# Patient Record
Sex: FEMALE | Race: WHITE | NOT HISPANIC OR LATINO | Employment: OTHER | URBAN - METROPOLITAN AREA
[De-identification: names, ages, dates, MRNs, and addresses within clinical notes are randomized per-mention and may not be internally consistent; named-entity substitution may affect disease eponyms.]

---

## 2021-06-02 ENCOUNTER — HOSPITAL ENCOUNTER (OUTPATIENT)
Facility: HOSPITAL | Age: 61
Setting detail: OBSERVATION
Discharge: HOME | End: 2021-06-04
Attending: EMERGENCY MEDICINE | Admitting: INTERNAL MEDICINE

## 2021-06-02 ENCOUNTER — APPOINTMENT (OUTPATIENT)
Dept: CT IMAGING | Facility: HOSPITAL | Age: 61
End: 2021-06-02

## 2021-06-02 ENCOUNTER — APPOINTMENT (OUTPATIENT)
Dept: GENERAL RADIOLOGY | Facility: HOSPITAL | Age: 61
End: 2021-06-02

## 2021-06-02 DIAGNOSIS — R07.9 CHEST PAIN, UNSPECIFIED TYPE: Primary | ICD-10-CM

## 2021-06-02 DIAGNOSIS — I47.1 SVT (SUPRAVENTRICULAR TACHYCARDIA) (HCC): ICD-10-CM

## 2021-06-02 LAB
ALBUMIN SERPL-MCNC: 4 G/DL (ref 3.5–5.2)
ALBUMIN/GLOB SERPL: 1.5 G/DL
ALP SERPL-CCNC: 140 U/L (ref 39–117)
ALT SERPL W P-5'-P-CCNC: 8 U/L (ref 1–33)
ANION GAP SERPL CALCULATED.3IONS-SCNC: 9 MMOL/L (ref 5–15)
AST SERPL-CCNC: 12 U/L (ref 1–32)
BASOPHILS # BLD AUTO: 0.04 10*3/MM3 (ref 0–0.2)
BASOPHILS NFR BLD AUTO: 0.5 % (ref 0–1.5)
BILIRUB SERPL-MCNC: 0.4 MG/DL (ref 0–1.2)
BUN SERPL-MCNC: 9 MG/DL (ref 8–23)
BUN/CREAT SERPL: 9.4 (ref 7–25)
CALCIUM SPEC-SCNC: 8.9 MG/DL (ref 8.6–10.5)
CHLORIDE SERPL-SCNC: 104 MMOL/L (ref 98–107)
CO2 SERPL-SCNC: 30 MMOL/L (ref 22–29)
CREAT SERPL-MCNC: 0.96 MG/DL (ref 0.57–1)
DEPRECATED RDW RBC AUTO: 47.3 FL (ref 37–54)
EOSINOPHIL # BLD AUTO: 0.39 10*3/MM3 (ref 0–0.4)
EOSINOPHIL NFR BLD AUTO: 5.1 % (ref 0.3–6.2)
ERYTHROCYTE [DISTWIDTH] IN BLOOD BY AUTOMATED COUNT: 13.2 % (ref 12.3–15.4)
GFR SERPL CREATININE-BSD FRML MDRD: 59 ML/MIN/1.73
GLOBULIN UR ELPH-MCNC: 2.7 GM/DL
GLUCOSE SERPL-MCNC: 101 MG/DL (ref 65–99)
HCT VFR BLD AUTO: 37.9 % (ref 34–46.6)
HGB BLD-MCNC: 11.8 G/DL (ref 12–15.9)
HOLD SPECIMEN: NORMAL
HOLD SPECIMEN: NORMAL
IMM GRANULOCYTES # BLD AUTO: 0.02 10*3/MM3 (ref 0–0.05)
IMM GRANULOCYTES NFR BLD AUTO: 0.3 % (ref 0–0.5)
LIPASE SERPL-CCNC: 7 U/L (ref 13–60)
LYMPHOCYTES # BLD AUTO: 1.68 10*3/MM3 (ref 0.7–3.1)
LYMPHOCYTES NFR BLD AUTO: 21.8 % (ref 19.6–45.3)
MCH RBC QN AUTO: 30.7 PG (ref 26.6–33)
MCHC RBC AUTO-ENTMCNC: 31.1 G/DL (ref 31.5–35.7)
MCV RBC AUTO: 98.7 FL (ref 79–97)
MONOCYTES # BLD AUTO: 0.68 10*3/MM3 (ref 0.1–0.9)
MONOCYTES NFR BLD AUTO: 8.8 % (ref 5–12)
NEUTROPHILS NFR BLD AUTO: 4.89 10*3/MM3 (ref 1.7–7)
NEUTROPHILS NFR BLD AUTO: 63.5 % (ref 42.7–76)
NRBC BLD AUTO-RTO: 0 /100 WBC (ref 0–0.2)
NT-PROBNP SERPL-MCNC: 3226 PG/ML (ref 0–900)
PLATELET # BLD AUTO: 262 10*3/MM3 (ref 140–450)
PMV BLD AUTO: 9.9 FL (ref 6–12)
POTASSIUM SERPL-SCNC: 3.7 MMOL/L (ref 3.5–5.2)
PROT SERPL-MCNC: 6.7 G/DL (ref 6–8.5)
RBC # BLD AUTO: 3.84 10*6/MM3 (ref 3.77–5.28)
SODIUM SERPL-SCNC: 143 MMOL/L (ref 136–145)
TROPONIN T SERPL-MCNC: <0.01 NG/ML (ref 0–0.03)
WBC # BLD AUTO: 7.7 10*3/MM3 (ref 3.4–10.8)
WHOLE BLOOD HOLD SPECIMEN: NORMAL
WHOLE BLOOD HOLD SPECIMEN: NORMAL

## 2021-06-02 PROCEDURE — 84145 PROCALCITONIN (PCT): CPT | Performed by: NURSE PRACTITIONER

## 2021-06-02 PROCEDURE — 99285 EMERGENCY DEPT VISIT HI MDM: CPT

## 2021-06-02 PROCEDURE — 80053 COMPREHEN METABOLIC PANEL: CPT | Performed by: EMERGENCY MEDICINE

## 2021-06-02 PROCEDURE — 85025 COMPLETE CBC W/AUTO DIFF WBC: CPT | Performed by: EMERGENCY MEDICINE

## 2021-06-02 PROCEDURE — 84484 ASSAY OF TROPONIN QUANT: CPT | Performed by: EMERGENCY MEDICINE

## 2021-06-02 PROCEDURE — 83880 ASSAY OF NATRIURETIC PEPTIDE: CPT | Performed by: EMERGENCY MEDICINE

## 2021-06-02 PROCEDURE — 83690 ASSAY OF LIPASE: CPT | Performed by: EMERGENCY MEDICINE

## 2021-06-02 PROCEDURE — 0 IOPAMIDOL PER 1 ML: Performed by: EMERGENCY MEDICINE

## 2021-06-02 PROCEDURE — 71275 CT ANGIOGRAPHY CHEST: CPT

## 2021-06-02 PROCEDURE — 93005 ELECTROCARDIOGRAM TRACING: CPT

## 2021-06-02 PROCEDURE — 93005 ELECTROCARDIOGRAM TRACING: CPT | Performed by: EMERGENCY MEDICINE

## 2021-06-02 PROCEDURE — 71045 X-RAY EXAM CHEST 1 VIEW: CPT

## 2021-06-02 RX ORDER — SODIUM CHLORIDE 0.9 % (FLUSH) 0.9 %
10 SYRINGE (ML) INJECTION AS NEEDED
Status: DISCONTINUED | OUTPATIENT
Start: 2021-06-02 | End: 2021-06-04 | Stop reason: HOSPADM

## 2021-06-02 RX ORDER — NITROGLYCERIN 20 MG/100ML
5-200 INJECTION INTRAVENOUS
Status: DISCONTINUED | OUTPATIENT
Start: 2021-06-02 | End: 2021-06-03

## 2021-06-02 RX ORDER — ASPIRIN 81 MG/1
324 TABLET, CHEWABLE ORAL ONCE
Status: DISCONTINUED | OUTPATIENT
Start: 2021-06-02 | End: 2021-06-02

## 2021-06-02 RX ADMIN — IOPAMIDOL 85 ML: 755 INJECTION, SOLUTION INTRAVENOUS at 23:14

## 2021-06-02 RX ADMIN — IOPAMIDOL 175 ML: 755 INJECTION, SOLUTION INTRAVENOUS at 23:28

## 2021-06-03 ENCOUNTER — APPOINTMENT (OUTPATIENT)
Dept: CARDIOLOGY | Facility: HOSPITAL | Age: 61
End: 2021-06-03

## 2021-06-03 PROBLEM — J45.909 ASTHMA: Status: ACTIVE | Noted: 2021-06-03

## 2021-06-03 PROBLEM — I48.91 ATRIAL FIBRILLATION (HCC): Status: ACTIVE | Noted: 2021-06-03

## 2021-06-03 PROBLEM — E66.01 OBESITY, MORBID, BMI 50 OR HIGHER (HCC): Status: ACTIVE | Noted: 2021-06-03

## 2021-06-03 PROBLEM — I10 HTN (HYPERTENSION): Status: ACTIVE | Noted: 2021-06-03

## 2021-06-03 PROBLEM — L03.90 CELLULITIS: Status: ACTIVE | Noted: 2021-06-03

## 2021-06-03 LAB
ANION GAP SERPL CALCULATED.3IONS-SCNC: 7 MMOL/L (ref 5–15)
APTT PPP: 27.9 SECONDS (ref 50–95)
BACTERIA UR QL AUTO: ABNORMAL /HPF
BASOPHILS # BLD AUTO: 0.04 10*3/MM3 (ref 0–0.2)
BASOPHILS # BLD AUTO: 0.05 10*3/MM3 (ref 0–0.2)
BASOPHILS NFR BLD AUTO: 0.6 % (ref 0–1.5)
BASOPHILS NFR BLD AUTO: 0.6 % (ref 0–1.5)
BH CV REST NUCLEAR ISOTOPE DOSE: 34.9 MCI
BH CV STRESS BP STAGE 1: NORMAL
BH CV STRESS BP STAGE 2: NORMAL
BH CV STRESS COMMENTS STAGE 1: NORMAL
BH CV STRESS DOSE REGADENOSON STAGE 1: 0.4
BH CV STRESS DURATION MIN STAGE 1: 1
BH CV STRESS DURATION MIN STAGE 2: 1
BH CV STRESS DURATION MIN STAGE 3: 1
BH CV STRESS DURATION MIN STAGE 4: 1
BH CV STRESS DURATION SEC STAGE 2: 0
BH CV STRESS HR STAGE 1: 110
BH CV STRESS HR STAGE 2: 112
BH CV STRESS HR STAGE 3: 112
BH CV STRESS HR STAGE 4: 110
BH CV STRESS NUCLEAR ISOTOPE DOSE: 34.9 MCI
BH CV STRESS O2 STAGE 1: 97
BH CV STRESS O2 STAGE 2: 97
BH CV STRESS O2 STAGE 3: 96
BH CV STRESS O2 STAGE 4: 96
BH CV STRESS PROTOCOL 1: NORMAL
BH CV STRESS RECOVERY BP: NORMAL MMHG
BH CV STRESS RECOVERY HR: 110 BPM
BH CV STRESS RECOVERY O2: 96 %
BH CV STRESS STAGE 1: 1
BH CV STRESS STAGE 2: 2
BH CV STRESS STAGE 3: 3
BH CV STRESS STAGE 4: 4
BILIRUB UR QL STRIP: NEGATIVE
BUN SERPL-MCNC: 8 MG/DL (ref 8–23)
BUN/CREAT SERPL: 9.1 (ref 7–25)
CALCIUM SPEC-SCNC: 8.1 MG/DL (ref 8.6–10.5)
CHLORIDE SERPL-SCNC: 104 MMOL/L (ref 98–107)
CHOLEST SERPL-MCNC: 124 MG/DL (ref 0–200)
CLARITY UR: ABNORMAL
CO2 SERPL-SCNC: 28 MMOL/L (ref 22–29)
COLOR UR: YELLOW
CREAT SERPL-MCNC: 0.88 MG/DL (ref 0.57–1)
D-LACTATE SERPL-SCNC: 0.7 MMOL/L (ref 0.5–2)
DEPRECATED RDW RBC AUTO: 47.8 FL (ref 37–54)
DEPRECATED RDW RBC AUTO: 47.9 FL (ref 37–54)
EOSINOPHIL # BLD AUTO: 0.4 10*3/MM3 (ref 0–0.4)
EOSINOPHIL # BLD AUTO: 0.41 10*3/MM3 (ref 0–0.4)
EOSINOPHIL NFR BLD AUTO: 5.2 % (ref 0.3–6.2)
EOSINOPHIL NFR BLD AUTO: 5.6 % (ref 0.3–6.2)
ERYTHROCYTE [DISTWIDTH] IN BLOOD BY AUTOMATED COUNT: 13.2 % (ref 12.3–15.4)
ERYTHROCYTE [DISTWIDTH] IN BLOOD BY AUTOMATED COUNT: 13.2 % (ref 12.3–15.4)
FLUAV RNA RESP QL NAA+PROBE: NOT DETECTED
FLUBV RNA RESP QL NAA+PROBE: NOT DETECTED
GFR SERPL CREATININE-BSD FRML MDRD: 65 ML/MIN/1.73
GLUCOSE SERPL-MCNC: 97 MG/DL (ref 65–99)
GLUCOSE UR STRIP-MCNC: NEGATIVE MG/DL
HBA1C MFR BLD: 5.2 % (ref 4.8–5.6)
HCT VFR BLD AUTO: 34 % (ref 34–46.6)
HCT VFR BLD AUTO: 36 % (ref 34–46.6)
HDLC SERPL-MCNC: 41 MG/DL (ref 40–60)
HGB BLD-MCNC: 10.6 G/DL (ref 12–15.9)
HGB BLD-MCNC: 11 G/DL (ref 12–15.9)
HGB UR QL STRIP.AUTO: ABNORMAL
HOLD SPECIMEN: NORMAL
HYALINE CASTS UR QL AUTO: ABNORMAL /LPF
IMM GRANULOCYTES # BLD AUTO: 0.01 10*3/MM3 (ref 0–0.05)
IMM GRANULOCYTES # BLD AUTO: 0.02 10*3/MM3 (ref 0–0.05)
IMM GRANULOCYTES NFR BLD AUTO: 0.1 % (ref 0–0.5)
IMM GRANULOCYTES NFR BLD AUTO: 0.3 % (ref 0–0.5)
INR PPP: 1.06 (ref 0.85–1.16)
KETONES UR QL STRIP: NEGATIVE
LDLC SERPL CALC-MCNC: 71 MG/DL (ref 0–100)
LDLC/HDLC SERPL: 1.76 {RATIO}
LEUKOCYTE ESTERASE UR QL STRIP.AUTO: ABNORMAL
LV EF NUC BP: 38 %
LYMPHOCYTES # BLD AUTO: 1.83 10*3/MM3 (ref 0.7–3.1)
LYMPHOCYTES # BLD AUTO: 1.84 10*3/MM3 (ref 0.7–3.1)
LYMPHOCYTES NFR BLD AUTO: 23.2 % (ref 19.6–45.3)
LYMPHOCYTES NFR BLD AUTO: 25.7 % (ref 19.6–45.3)
MAGNESIUM SERPL-MCNC: 1.7 MG/DL (ref 1.6–2.4)
MAXIMAL PREDICTED HEART RATE: 158 BPM
MCH RBC QN AUTO: 30.1 PG (ref 26.6–33)
MCH RBC QN AUTO: 30.9 PG (ref 26.6–33)
MCHC RBC AUTO-ENTMCNC: 30.6 G/DL (ref 31.5–35.7)
MCHC RBC AUTO-ENTMCNC: 31.2 G/DL (ref 31.5–35.7)
MCV RBC AUTO: 98.6 FL (ref 79–97)
MCV RBC AUTO: 99.1 FL (ref 79–97)
MONOCYTES # BLD AUTO: 0.78 10*3/MM3 (ref 0.1–0.9)
MONOCYTES # BLD AUTO: 0.81 10*3/MM3 (ref 0.1–0.9)
MONOCYTES NFR BLD AUTO: 11.3 % (ref 5–12)
MONOCYTES NFR BLD AUTO: 9.9 % (ref 5–12)
NEUTROPHILS NFR BLD AUTO: 4.06 10*3/MM3 (ref 1.7–7)
NEUTROPHILS NFR BLD AUTO: 4.8 10*3/MM3 (ref 1.7–7)
NEUTROPHILS NFR BLD AUTO: 56.7 % (ref 42.7–76)
NEUTROPHILS NFR BLD AUTO: 60.8 % (ref 42.7–76)
NITRITE UR QL STRIP: POSITIVE
NRBC BLD AUTO-RTO: 0 /100 WBC (ref 0–0.2)
NRBC BLD AUTO-RTO: 0 /100 WBC (ref 0–0.2)
PERCENT MAX PREDICTED HR: 71.52 %
PH UR STRIP.AUTO: 6 [PH] (ref 5–8)
PLATELET # BLD AUTO: 231 10*3/MM3 (ref 140–450)
PLATELET # BLD AUTO: 251 10*3/MM3 (ref 140–450)
PMV BLD AUTO: 10 FL (ref 6–12)
PMV BLD AUTO: 9.8 FL (ref 6–12)
POTASSIUM SERPL-SCNC: 3.6 MMOL/L (ref 3.5–5.2)
PROCALCITONIN SERPL-MCNC: 0.05 NG/ML (ref 0–0.25)
PROT UR QL STRIP: NEGATIVE
PROTHROMBIN TIME: 13.5 SECONDS (ref 11.4–14.4)
RBC # BLD AUTO: 3.43 10*6/MM3 (ref 3.77–5.28)
RBC # BLD AUTO: 3.65 10*6/MM3 (ref 3.77–5.28)
RBC # UR: ABNORMAL /HPF
REF LAB TEST METHOD: ABNORMAL
SARS-COV-2 RNA RESP QL NAA+PROBE: NOT DETECTED
SODIUM SERPL-SCNC: 139 MMOL/L (ref 136–145)
SP GR UR STRIP: 1.06 (ref 1–1.03)
SQUAMOUS #/AREA URNS HPF: ABNORMAL /HPF
STRESS BASELINE BP: NORMAL MMHG
STRESS BASELINE HR: 108 BPM
STRESS O2 SAT REST: 96 %
STRESS PERCENT HR: 84 %
STRESS POST ESTIMATED WORKLOAD: 1 METS
STRESS POST EXERCISE DUR MIN: 4 MIN
STRESS POST EXERCISE DUR SEC: 0 SEC
STRESS POST O2 SAT PEAK: 96 %
STRESS POST PEAK BP: NORMAL MMHG
STRESS POST PEAK HR: 113 BPM
STRESS TARGET HR: 134 BPM
TRIGL SERPL-MCNC: 55 MG/DL (ref 0–150)
TROPONIN T SERPL-MCNC: <0.01 NG/ML (ref 0–0.03)
TSH SERPL DL<=0.05 MIU/L-ACNC: 2.6 UIU/ML (ref 0.27–4.2)
UFH PPP CHRO-ACNC: 0.1 IU/ML (ref 0.3–0.7)
UFH PPP CHRO-ACNC: 0.1 IU/ML (ref 0.3–0.7)
UFH PPP CHRO-ACNC: 0.56 IU/ML (ref 0.3–0.7)
UROBILINOGEN UR QL STRIP: ABNORMAL
VLDLC SERPL-MCNC: 12 MG/DL (ref 5–40)
WBC # BLD AUTO: 7.16 10*3/MM3 (ref 3.4–10.8)
WBC # BLD AUTO: 7.89 10*3/MM3 (ref 3.4–10.8)
WBC UR QL AUTO: ABNORMAL /HPF

## 2021-06-03 PROCEDURE — 93005 ELECTROCARDIOGRAM TRACING: CPT | Performed by: NURSE PRACTITIONER

## 2021-06-03 PROCEDURE — 25010000002 HEPARIN (PORCINE) PER 1000 UNITS: Performed by: NURSE PRACTITIONER

## 2021-06-03 PROCEDURE — 94799 UNLISTED PULMONARY SVC/PX: CPT

## 2021-06-03 PROCEDURE — 93018 CV STRESS TEST I&R ONLY: CPT | Performed by: INTERNAL MEDICINE

## 2021-06-03 PROCEDURE — 85520 HEPARIN ASSAY: CPT

## 2021-06-03 PROCEDURE — 96366 THER/PROPH/DIAG IV INF ADDON: CPT

## 2021-06-03 PROCEDURE — 78492 MYOCRD IMG PET MLT RST&STRS: CPT

## 2021-06-03 PROCEDURE — 87186 SC STD MICRODIL/AGAR DIL: CPT | Performed by: NURSE PRACTITIONER

## 2021-06-03 PROCEDURE — 85025 COMPLETE CBC W/AUTO DIFF WBC: CPT | Performed by: NURSE PRACTITIONER

## 2021-06-03 PROCEDURE — 93017 CV STRESS TEST TRACING ONLY: CPT

## 2021-06-03 PROCEDURE — 25010000002 REGADENOSON 0.4 MG/5ML SOLUTION: Performed by: NURSE PRACTITIONER

## 2021-06-03 PROCEDURE — G0378 HOSPITAL OBSERVATION PER HR: HCPCS

## 2021-06-03 PROCEDURE — 99204 OFFICE O/P NEW MOD 45 MIN: CPT | Performed by: INTERNAL MEDICINE

## 2021-06-03 PROCEDURE — 85730 THROMBOPLASTIN TIME PARTIAL: CPT | Performed by: NURSE PRACTITIONER

## 2021-06-03 PROCEDURE — 87086 URINE CULTURE/COLONY COUNT: CPT | Performed by: NURSE PRACTITIONER

## 2021-06-03 PROCEDURE — 78492 MYOCRD IMG PET MLT RST&STRS: CPT | Performed by: INTERNAL MEDICINE

## 2021-06-03 PROCEDURE — 84484 ASSAY OF TROPONIN QUANT: CPT | Performed by: NURSE PRACTITIONER

## 2021-06-03 PROCEDURE — 96375 TX/PRO/DX INJ NEW DRUG ADDON: CPT

## 2021-06-03 PROCEDURE — 80061 LIPID PANEL: CPT | Performed by: NURSE PRACTITIONER

## 2021-06-03 PROCEDURE — 96376 TX/PRO/DX INJ SAME DRUG ADON: CPT

## 2021-06-03 PROCEDURE — 83605 ASSAY OF LACTIC ACID: CPT | Performed by: NURSE PRACTITIONER

## 2021-06-03 PROCEDURE — 85520 HEPARIN ASSAY: CPT | Performed by: NURSE PRACTITIONER

## 2021-06-03 PROCEDURE — 83735 ASSAY OF MAGNESIUM: CPT | Performed by: NURSE PRACTITIONER

## 2021-06-03 PROCEDURE — 96368 THER/DIAG CONCURRENT INF: CPT

## 2021-06-03 PROCEDURE — 83036 HEMOGLOBIN GLYCOSYLATED A1C: CPT | Performed by: NURSE PRACTITIONER

## 2021-06-03 PROCEDURE — 84443 ASSAY THYROID STIM HORMONE: CPT | Performed by: NURSE PRACTITIONER

## 2021-06-03 PROCEDURE — 80048 BASIC METABOLIC PNL TOTAL CA: CPT | Performed by: NURSE PRACTITIONER

## 2021-06-03 PROCEDURE — 25010000002 HEPARIN (PORCINE) 25000-0.45 UT/250ML-% SOLUTION

## 2021-06-03 PROCEDURE — 0 RUBIDIUM CHLORIDE: Performed by: NURSE PRACTITIONER

## 2021-06-03 PROCEDURE — 96365 THER/PROPH/DIAG IV INF INIT: CPT

## 2021-06-03 PROCEDURE — 87077 CULTURE AEROBIC IDENTIFY: CPT | Performed by: NURSE PRACTITIONER

## 2021-06-03 PROCEDURE — 81001 URINALYSIS AUTO W/SCOPE: CPT | Performed by: NURSE PRACTITIONER

## 2021-06-03 PROCEDURE — 87636 SARSCOV2 & INF A&B AMP PRB: CPT | Performed by: INTERNAL MEDICINE

## 2021-06-03 PROCEDURE — 25010000002 ONDANSETRON PER 1 MG: Performed by: NURSE PRACTITIONER

## 2021-06-03 PROCEDURE — 25010000002 CEFTRIAXONE PER 250 MG: Performed by: INTERNAL MEDICINE

## 2021-06-03 PROCEDURE — 99220 PR INITIAL OBSERVATION CARE/DAY 70 MINUTES: CPT | Performed by: INTERNAL MEDICINE

## 2021-06-03 PROCEDURE — A9555 RB82 RUBIDIUM: HCPCS | Performed by: NURSE PRACTITIONER

## 2021-06-03 PROCEDURE — 85610 PROTHROMBIN TIME: CPT | Performed by: NURSE PRACTITIONER

## 2021-06-03 PROCEDURE — 94640 AIRWAY INHALATION TREATMENT: CPT

## 2021-06-03 PROCEDURE — 25010000002 HEPARIN (PORCINE) PER 1000 UNITS

## 2021-06-03 PROCEDURE — 93010 ELECTROCARDIOGRAM REPORT: CPT | Performed by: INTERNAL MEDICINE

## 2021-06-03 RX ORDER — HEPARIN SODIUM 1000 [USP'U]/ML
8000 INJECTION, SOLUTION INTRAVENOUS; SUBCUTANEOUS ONCE
Status: COMPLETED | OUTPATIENT
Start: 2021-06-03 | End: 2021-06-03

## 2021-06-03 RX ORDER — LANOLIN ALCOHOL/MO/W.PET/CERES
1000 CREAM (GRAM) TOPICAL DAILY
COMMUNITY

## 2021-06-03 RX ORDER — DULOXETIN HYDROCHLORIDE 60 MG/1
60 CAPSULE, DELAYED RELEASE ORAL DAILY
COMMUNITY

## 2021-06-03 RX ORDER — NYSTATIN 100000 [USP'U]/G
POWDER TOPICAL EVERY 12 HOURS SCHEDULED
Status: DISCONTINUED | OUTPATIENT
Start: 2021-06-03 | End: 2021-06-04 | Stop reason: HOSPADM

## 2021-06-03 RX ORDER — FERROUS SULFATE 325(65) MG
325 TABLET ORAL
COMMUNITY

## 2021-06-03 RX ORDER — OXYBUTYNIN CHLORIDE 5 MG/1
5 TABLET, EXTENDED RELEASE ORAL DAILY
Status: DISCONTINUED | OUTPATIENT
Start: 2021-06-03 | End: 2021-06-04 | Stop reason: HOSPADM

## 2021-06-03 RX ORDER — ONDANSETRON 8 MG/1
8 TABLET, ORALLY DISINTEGRATING ORAL EVERY 8 HOURS PRN
COMMUNITY

## 2021-06-03 RX ORDER — HYDROXYZINE HYDROCHLORIDE 25 MG/1
25 TABLET, FILM COATED ORAL 3 TIMES DAILY PRN
COMMUNITY

## 2021-06-03 RX ORDER — PROMETHAZINE HYDROCHLORIDE 25 MG/1
25 TABLET ORAL EVERY 6 HOURS PRN
COMMUNITY

## 2021-06-03 RX ORDER — HEPARIN SODIUM 10000 [USP'U]/100ML
8 INJECTION, SOLUTION INTRAVENOUS
Status: DISCONTINUED | OUTPATIENT
Start: 2021-06-03 | End: 2021-06-03

## 2021-06-03 RX ORDER — OMEPRAZOLE 40 MG/1
40 CAPSULE, DELAYED RELEASE ORAL DAILY
COMMUNITY

## 2021-06-03 RX ORDER — HEPARIN SODIUM 10000 [USP'U]/100ML
6.8 INJECTION, SOLUTION INTRAVENOUS
Status: DISCONTINUED | OUTPATIENT
Start: 2021-06-03 | End: 2021-06-03

## 2021-06-03 RX ORDER — IPRATROPIUM BROMIDE AND ALBUTEROL SULFATE 2.5; .5 MG/3ML; MG/3ML
3 SOLUTION RESPIRATORY (INHALATION)
Status: DISCONTINUED | OUTPATIENT
Start: 2021-06-03 | End: 2021-06-04 | Stop reason: HOSPADM

## 2021-06-03 RX ORDER — ONDANSETRON 4 MG/1
4 TABLET, FILM COATED ORAL EVERY 6 HOURS PRN
Status: DISCONTINUED | OUTPATIENT
Start: 2021-06-03 | End: 2021-06-04 | Stop reason: HOSPADM

## 2021-06-03 RX ORDER — HEPARIN SODIUM 1000 [USP'U]/ML
4000 INJECTION, SOLUTION INTRAVENOUS; SUBCUTANEOUS ONCE
Status: COMPLETED | OUTPATIENT
Start: 2021-06-03 | End: 2021-06-03

## 2021-06-03 RX ORDER — GABAPENTIN 300 MG/1
300 CAPSULE ORAL 2 TIMES DAILY
COMMUNITY

## 2021-06-03 RX ORDER — AMITRIPTYLINE HYDROCHLORIDE 25 MG/1
25 TABLET, FILM COATED ORAL NIGHTLY
COMMUNITY

## 2021-06-03 RX ORDER — ONDANSETRON 2 MG/ML
4 INJECTION INTRAMUSCULAR; INTRAVENOUS EVERY 6 HOURS PRN
Status: DISCONTINUED | OUTPATIENT
Start: 2021-06-03 | End: 2021-06-04 | Stop reason: HOSPADM

## 2021-06-03 RX ORDER — ACETAMINOPHEN 325 MG/1
650 TABLET ORAL EVERY 4 HOURS PRN
Status: DISCONTINUED | OUTPATIENT
Start: 2021-06-03 | End: 2021-06-04 | Stop reason: HOSPADM

## 2021-06-03 RX ORDER — ATORVASTATIN CALCIUM 20 MG/1
20 TABLET, FILM COATED ORAL NIGHTLY
Status: DISCONTINUED | OUTPATIENT
Start: 2021-06-03 | End: 2021-06-04 | Stop reason: HOSPADM

## 2021-06-03 RX ORDER — SODIUM CHLORIDE 9 MG/ML
100 INJECTION, SOLUTION INTRAVENOUS CONTINUOUS
Status: ACTIVE | OUTPATIENT
Start: 2021-06-03 | End: 2021-06-04

## 2021-06-03 RX ORDER — ASPIRIN 81 MG/1
81 TABLET ORAL DAILY
Status: DISCONTINUED | OUTPATIENT
Start: 2021-06-03 | End: 2021-06-04 | Stop reason: HOSPADM

## 2021-06-03 RX ORDER — SODIUM CHLORIDE 0.9 % (FLUSH) 0.9 %
10 SYRINGE (ML) INJECTION AS NEEDED
Status: DISCONTINUED | OUTPATIENT
Start: 2021-06-03 | End: 2021-06-04 | Stop reason: HOSPADM

## 2021-06-03 RX ORDER — FUROSEMIDE 20 MG/1
20 TABLET ORAL DAILY
COMMUNITY

## 2021-06-03 RX ORDER — IPRATROPIUM BROMIDE AND ALBUTEROL SULFATE 2.5; .5 MG/3ML; MG/3ML
3 SOLUTION RESPIRATORY (INHALATION) EVERY 4 HOURS PRN
Status: DISCONTINUED | OUTPATIENT
Start: 2021-06-03 | End: 2021-06-04 | Stop reason: HOSPADM

## 2021-06-03 RX ORDER — HYDROCODONE BITARTRATE AND ACETAMINOPHEN 7.5; 325 MG/1; MG/1
2 TABLET ORAL 2 TIMES DAILY
COMMUNITY

## 2021-06-03 RX ORDER — HEPARIN SODIUM 1000 [USP'U]/ML
30 INJECTION, SOLUTION INTRAVENOUS; SUBCUTANEOUS AS NEEDED
Status: DISCONTINUED | OUTPATIENT
Start: 2021-06-03 | End: 2021-06-03 | Stop reason: SDUPTHER

## 2021-06-03 RX ORDER — SODIUM CHLORIDE 0.9 % (FLUSH) 0.9 %
10 SYRINGE (ML) INJECTION EVERY 12 HOURS SCHEDULED
Status: DISCONTINUED | OUTPATIENT
Start: 2021-06-03 | End: 2021-06-04 | Stop reason: HOSPADM

## 2021-06-03 RX ORDER — HEPARIN SODIUM 1000 [USP'U]/ML
60 INJECTION, SOLUTION INTRAVENOUS; SUBCUTANEOUS AS NEEDED
Status: DISCONTINUED | OUTPATIENT
Start: 2021-06-03 | End: 2021-06-03 | Stop reason: SDUPTHER

## 2021-06-03 RX ADMIN — SODIUM CHLORIDE 1 G: 900 INJECTION INTRAVENOUS at 09:08

## 2021-06-03 RX ADMIN — REGADENOSON 0.4 MG: 0.08 INJECTION, SOLUTION INTRAVENOUS at 15:53

## 2021-06-03 RX ADMIN — ASPIRIN 81 MG: 81 TABLET, COATED ORAL at 19:38

## 2021-06-03 RX ADMIN — SODIUM CHLORIDE, PRESERVATIVE FREE 10 ML: 5 INJECTION INTRAVENOUS at 20:34

## 2021-06-03 RX ADMIN — HEPARIN SODIUM 8000 UNITS: 1000 INJECTION INTRAVENOUS; SUBCUTANEOUS at 09:09

## 2021-06-03 RX ADMIN — HEPARIN SODIUM 5.5 UNITS/KG/HR: 10000 INJECTION, SOLUTION INTRAVENOUS at 03:05

## 2021-06-03 RX ADMIN — ATORVASTATIN CALCIUM 20 MG: 20 TABLET, FILM COATED ORAL at 22:32

## 2021-06-03 RX ADMIN — SODIUM CHLORIDE 100 ML/HR: 9 INJECTION, SOLUTION INTRAVENOUS at 19:39

## 2021-06-03 RX ADMIN — OXYBUTYNIN CHLORIDE 5 MG: 5 TABLET, EXTENDED RELEASE ORAL at 07:31

## 2021-06-03 RX ADMIN — NYSTATIN: 100000 POWDER TOPICAL at 22:32

## 2021-06-03 RX ADMIN — HEPARIN SODIUM 4000 UNITS: 1000 INJECTION, SOLUTION INTRAVENOUS; SUBCUTANEOUS at 03:03

## 2021-06-03 RX ADMIN — DOXYCYCLINE 100 MG: 100 INJECTION, POWDER, LYOPHILIZED, FOR SOLUTION INTRAVENOUS at 03:05

## 2021-06-03 RX ADMIN — IPRATROPIUM BROMIDE AND ALBUTEROL SULFATE 3 ML: 2.5; .5 SOLUTION RESPIRATORY (INHALATION) at 07:35

## 2021-06-03 RX ADMIN — IPRATROPIUM BROMIDE AND ALBUTEROL SULFATE 3 ML: 2.5; .5 SOLUTION RESPIRATORY (INHALATION) at 20:11

## 2021-06-03 RX ADMIN — IPRATROPIUM BROMIDE AND ALBUTEROL SULFATE 3 ML: 2.5; .5 SOLUTION RESPIRATORY (INHALATION) at 11:35

## 2021-06-03 RX ADMIN — RUBIDIUM CHLORIDE RB-82 1 DOSE: 150 INJECTION, SOLUTION INTRAVENOUS at 15:58

## 2021-06-03 RX ADMIN — RUBIDIUM CHLORIDE RB-82 1 DOSE: 150 INJECTION, SOLUTION INTRAVENOUS at 15:45

## 2021-06-03 RX ADMIN — ONDANSETRON 4 MG: 2 INJECTION INTRAMUSCULAR; INTRAVENOUS at 20:34

## 2021-06-03 NOTE — CASE MANAGEMENT/SOCIAL WORK
Discharge Planning Assessment  [facility]     Patient Name: Randa Hermosillo  MRN: [id number]  Today's Date: 6/3/2021    Admit Date: 6/2/2021    Discharge Needs Assessment     Row Name 06/03/21 1128       Living Environment    Lives With  significant other    Current Living Arrangements  other (see comments)    Duration at Residence  Stayinf with a friend    Primary Care Provided by  self;spouse/significant other    Provides Primary Care For  no one    Family Caregiver if Needed  significant other    Quality of Family Relationships  helpful    Able to Return to Prior Arrangements  yes       Resource/Environmental Concerns    Resource/Environmental Concerns  none       Transition Planning    Patient/Family Anticipates Transition to  other (see comments)    Patient/Family Anticipated Services at Transition  none    Transportation Anticipated  family or friend will provide       Discharge Needs Assessment    Readmission Within the Last 30 Days  no previous admission in last 30 days    Equipment Currently Used at Home  walker, rolling;wheelchair    Concerns to be Addressed  discharge planning    Equipment Needed After Discharge  walker, rolling;wheelchair, manual        Discharge Plan     Row Name 06/03/21 1124       Plan    Plan  Met with the patient for a discharge planning assessment. She said she is from [place] and she was staying with her significant other and a friend in [place]. She tiffanie mostly wheelchair bound and her wheelchair is at the friends house.  She said the likely discharge plan is to have her significant other and friend come to the hospital to pick her up and she may be retruning with her significant other to [place]. All of her medical care is in [place]. Her insurance is [employer]. Case managment will continue to follow for discharge needs.        Continued Care and Services - Admitted Since 6/2/2021    Coordination has not been started for this encounter.         Demographic Summary      Row Name 06/03/21 1127       General Information    Admission Type  inpatient    Arrived From  home    Reason for Consult  discharge planning    Preferred Language  English       Contact Information    Permission Granted to Share Info With      Contact Information Obtained for          Functional Status     Row Name 06/03/21 1128       Functional Status    Usual Activity Tolerance  moderate    Current Activity Tolerance  moderate       Functional Status, IADL    Medications  assistive equipment and person    Meal Preparation  assistive equipment and person    Housekeeping  assistive equipment and person    Laundry  assistive equipment and person    Shopping  assistive equipment and person       Employment/    Employment Status  disabled        Psychosocial    No documentation.       Abuse/Neglect    No documentation.       Legal    No documentation.       Substance Abuse    No documentation.       Patient Forms    No documentation.           PRERNA Dillon

## 2021-06-03 NOTE — PROGRESS NOTES
[facility] Medicine Services  PROGRESS NOTE    Patient Name: Randa Hermosillo  : 1960  MRN: [id number]    Date of Admission: 2021  Primary Care Physician: Shruti Pacheco MD    Subjective   Subjective     CC:  Chest pain     HPI:  Back from stress test.  No further chest pain.  She was moved to a room with morales lift.     ROS:  No further chest pain  No dyspnea  Skin of legs looks baseline to her  Limited mobility - can stand and pivot.    Says she is managing okay at home.     Objective   Objective     Vital Signs:   Temp:  [97.8 °F (36.6 °C)-98.1 °F (36.7 °C)] 97.8 °F (36.6 °C)  Heart Rate:  [89-98] 92  Resp:  [18-20] 20  BP: ()/(70-91) 147/88        Physical Exam:  Gen:  WD/WN woman in bed, obese pleasant and alert   Neuro: alert and oriented, clear speech, follows commands, grossly nonfocal  HEENT:  NC/AT PERRL, OP benign  Neck:  Supple, no LAD  Heart RRR no murmur, rub, or gallop  Lungs nonlabored.  Clear anteriorly   Abd:  Soft, nontender, no rebound or guarding, pos BS  Extrem:  No c/c  Chronic changes to lower legs.        Results Reviewed:  Results from last 7 days   Lab Units 21  0403 21  0205 21  2131   WBC 10*3/mm3 7.16 7.89 7.70   HEMOGLOBIN g/dL 10.6* 11.0* 11.8*   HEMATOCRIT % 34.0 36.0 37.9   PLATELETS 10*3/mm3 231 251 262   INR   --  1.06  --    PROCALCITONIN ng/mL  --   --  0.05     Results from last 7 days   Lab Units 21  0403 21  2346 21  2131   SODIUM mmol/L 139  --  143   POTASSIUM mmol/L 3.6  --  3.7   CHLORIDE mmol/L 104  --  104   CO2 mmol/L 28.0  --  30.0*   BUN mg/dL 8  --  9   CREATININE mg/dL 0.88  --  0.96   GLUCOSE mg/dL 97  --  101*   CALCIUM mg/dL 8.1*  --  8.9   ALT (SGPT) U/L  --   --  8   AST (SGOT) U/L  --   --  12   TROPONIN T ng/mL <0.010 <0.010 <0.010   PROBNP pg/mL  --   --  3,226.0*     Estimated Creatinine Clearance: 114.1 mL/min (by C-G formula based on SCr of 0.88 mg/dL).    Microbiology  Results Abnormal     Procedure Component Value - Date/Time    COVID PRE-OP / PRE-PROCEDURE SCREENING ORDER (NO ISOLATION) - Swab, Nasopharynx [[id number]]  (Normal) Collected: 06/03/21 0026    Lab Status: Final result Specimen: Swab from Nasopharynx Updated: 06/03/21 0054    Narrative:      The following orders were created for panel order COVID PRE-OP / PRE-PROCEDURE SCREENING ORDER (NO ISOLATION) - Swab, Nasopharynx.  Procedure                               Abnormality         Status                     ---------                               -----------         ------                     COVID-19 and FLU A/B PCR...[[id number]]  Normal              Final result                 Please view results for these tests on the individual orders.    COVID-19 and FLU A/B PCR - Swab, Nasopharynx [[id number]]  (Normal) Collected: 06/03/21 0026    Lab Status: Final result Specimen: Swab from Nasopharynx Updated: 06/03/21 0054     COVID19 Not Detected     Influenza A PCR Not Detected     Influenza B PCR Not Detected    Narrative:      Fact sheet for providers: https://www.fda.gov/media/960360/download    Fact sheet for patients: https://www.fda.gov/media/188566/download    Test performed by PCR.          Imaging Results (Last 24 Hours)     Procedure Component Value Units Date/Time    CT Angiogram Chest [[id number]] Collected: 06/02/21 2343     Updated: 06/02/21 2345    Narrative:      CTA Chest    INDICATION:   Chest pain, dissection protocol    TECHNIQUE:   CT angiogram of the chest with IV contrast. 3-D reconstructions were obtained and reviewed.   Radiation dose reduction techniques included automated exposure control or exposure modulation based on body size. Count of known CT and cardiac nuc med studies  performed in previous 12 months: 0.     COMPARISON:   None available.    FINDINGS:   No evidence of pulmonary embolism. The thoracic aorta appears unremarkable. There is no evidence of dissection. Postsurgical changes are seen  at the GE junction and there is recurrent/residual hiatal hernia. Fluid level is seen in patients upper thoracic  esophagus. Cardiac and mediastinal structures appear within normal limits. There are scattered areas of groundglass probable atelectasis with no definite concerning focal consolidation. No pneumothorax or effusion. Upper abdominal structures appear  within normal limits. With no acute osseous abnormality.      Impression:      1. Negative for pulmonary embolus.  2. No acute findings in the chest.  3. Postsurgical changes are seen at the GE junction and there is recurrent/residual hiatal hernia.    Signer Name: Hannah Novak MD   Signed: 6/2/2021 11:43 PM   Workstation Name: [id number]    [facility]    XR Chest 1 View [[id number]] Collected: 06/02/21 2026     Updated: 06/02/21 2028    Narrative:      CR Chest 1 Vw    INDICATION:   Left-sided chest pain radiating to neck today     COMPARISON:    None available.    FINDINGS:  Single portable AP view(s) of the chest.  The heart and mediastinal contours are normal. The lungs are clear. No pneumothorax or pleural effusion.      Impression:      No acute cardiopulmonary findings.    Signer Name: Brian Reid MD   Signed: 6/2/2021 8:26 PM   Workstation Name: [id number]    [facility]              I have reviewed the medications:  Scheduled Meds:atorvastatin, 20 mg, Oral, Nightly  doxycycline, 100 mg, Intravenous, Q12H  ipratropium-albuterol, 3 mL, Nebulization, 4x Daily - RT  oxybutynin XL, 5 mg, Oral, Daily  sodium chloride, 10 mL, Intravenous, Q12H      Continuous Infusions:heparin, 5.5 Units/kg/hr, Last Rate: 5.5 Units/kg/hr (06/03/21 0506)  nitroglycerin, 5-200 mcg/min, Last Rate: Stopped (06/03/21 0024)  Pharmacy to Dose Heparin,       PRN Meds:.•  acetaminophen  •  ipratropium-albuterol  •  ondansetron **OR** ondansetron  •  Pharmacy to Dose Heparin  •  sodium chloride  •  sodium chloride    Assessment/Plan    Assessment & Plan     Active Hospital Problems    Diagnosis  POA   • **Chest pain [R07.9]  Yes   • HTN (hypertension) [I10]  Yes   • Asthma [J45.909]  Yes   • Obesity, morbid, BMI 50 or higher (CMS/HCC) [E66.01]  Yes   • Atrial fibrillation (CMS/[facility]) [I48.91]  Yes   • Cellulitis [L03.90]  Yes      Resolved Hospital Problems   No resolved problems to display.        Brief Hospital Course to date:  Randa Hermosillo is a 60 y.o. female w hist of Christoph-en-Y, asthma, hypertension.  Presented to ED w chest pain.  Sudden onset, left side.  New Afib noted in ED, paroxysmal.      New PAFib reported  - hep gtt  - echo  - cards consult  - replace mag  - likely dc with zio patch.     Chest pain  - trops neg, EKG no ST-T changes  -CTA Chest without acute findings   -Cardiology consulted     BLE cellulitis vs chronic stasis dermatitis   - BLE with chronic skin changes  - stop doxy    Asthma   - nebs prn     HTN  UTI - CTX   - culture pending     Limited mobility  - will likely need ambulace transport home.      DVT Prophylaxis:  Hep gtt      Disposition: I expect the patient to be discharged tomorrow if transport arranged    CODE STATUS:   Code Status and Medical Interventions:   Ordered at: 06/03/21 0104     Code Status:    CPR     Medical Interventions (Level of Support Prior to Arrest):    Full       Tatum Muhammad MD  06/03/21

## 2021-06-03 NOTE — H&P
"    [facility] Medicine Services  HISTORY AND PHYSICAL    Patient Name: Randa Hermosillo  : 1960  MRN: [id number]  Primary Care Physician: Shruti Pacheco MD  Date of admission: 2021    Subjective   Subjective     Chief Complaint:  Chest pain     HPI:  Randa Hermosillo is a 60 y.o. female w/ a hx of HTN, asthma, remote tamika-en-y surgery who presented to the ED w/ c/o chest pain.   Pt c/o sudden onset left sided chest pain while at rest this afternoon. Pain located under the left breast w/ radiation to her left shoulder and left neck. Associated dyspnea, no nausea, vomiting or diaphoresis. At worst pain rated 8.5/10. Pt given ASA and Nitro PTA w/ resolution of chest pain.   Pt w/ remote cardiac stress test ~ 5 years ago that was \"normal\". Has never had a cardiac cath.   Pt denies recent illness. Denies fever/chills, dyspnea, cough, N/V/D, abdominal pain, dysuria, edema, syncope, confusion. Has not had the COVID vaccinations. Pt reports chronic BLE edema w/ erythema- no change from baseline.   Pt evaluated in the ED. EKG initially c/w NSR; repeat c/w A.Fib and troponin WNL/unremarkable. Pt admitted to the hospital medicine service for further evaluation.     COVID Details:    Symptoms:    [x] NONE [] Fever []  Cough [] Shortness of breath [] Change in taste/smell      The patient qualifies to receive the vaccine, but they have not yet received it.    Review of Systems   Constitutional: Negative.  Negative for chills, fatigue and fever.   HENT: Negative.  Negative for congestion, postnasal drip, rhinorrhea, sinus pressure, sinus pain, sneezing, sore throat and trouble swallowing.    Eyes: Negative.  Negative for visual disturbance.   Respiratory: Positive for shortness of breath. Negative for cough, wheezing and stridor.         Shortness of breath w/ chest pain onset.    Cardiovascular: Positive for chest pain and leg swelling. Negative for palpitations.        Sudden onset chest " pain.  Chronic BLE edema; no worse than baseline.    Gastrointestinal: Negative.  Negative for abdominal distention, abdominal pain, blood in stool, constipation, diarrhea, nausea and vomiting.   Endocrine: Negative.    Genitourinary: Negative.  Negative for decreased urine volume, difficulty urinating, dysuria, flank pain, frequency, hematuria, pelvic pain and urgency.   Musculoskeletal: Negative.  Negative for arthralgias, myalgias, neck pain and neck stiffness.   Skin: Negative.  Negative for wound.   Allergic/Immunologic: Negative.  Negative for immunocompromised state.   Neurological: Negative.  Negative for dizziness, tremors, seizures, syncope, facial asymmetry, speech difficulty, weakness, light-headedness, numbness and headaches.   Hematological: Negative.  Does not bruise/bleed easily.   Psychiatric/Behavioral: Negative.  Negative for confusion.   All other systems reviewed and are negative.     Personal History     Past Medical History:   Diagnosis Date   • Asthma    • Chronic edema     BLE    • HTN (hypertension)        Past Surgical History:   Procedure Laterality Date   • CHOLECYSTECTOMY     • IRAIDA-EN-Y PROCEDURE     • TUBAL ABDOMINAL LIGATION         Family History: family history includes COPD in her father; Heart failure in her mother. Otherwise pertinent FHx was reviewed and unremarkable.     Social History:  reports that she has never smoked. She has never used smokeless tobacco. She reports that she does not drink alcohol and does not use drugs.  Social History     Social History Narrative    Disabled.        Medications:  [facility]    Allergies   Allergen Reactions   • Percocet [Oxycodone-Acetaminophen] Nausea And Vomiting       Objective   Objective     Vital Signs:   Temp:  [98.1 °F (36.7 °C)] 98.1 °F (36.7 °C)  Heart Rate:  [89-98] 89  Resp:  [18] 18  BP: ()/(71-91) 152/83    Physical Exam     Constitutional: Awake, alert; non-toxic appearing   Eyes: PERRLA, sclerae anicteric, no  conjunctival injection  HENT: NCAT, mucous membranes moist  Neck: Supple, no thyromegaly, no lymphadenopathy, trachea midline  Respiratory: expiratory wheeze upper lobes bilaterally; exam limited by body habitus, nonlabored respirations at rest- mild increase in respirations w/ prolonged conversation   Cardiovascular: RRR, no murmurs, rubs, or gallops, BLE w/ edema and chronic erythema   Gastrointestinal: Positive bowel sounds, soft, nontender, nondistended  Musculoskeletal: Normal ROM bilaterally; ROM limited by body habitus   Psychiatric: Appropriate affect, cooperative  Neurologic: Oriented x 3, strength symmetric in all extremities, Cranial Nerves grossly intact to confrontation, speech clear  Skin: No rashes, lesions or wounds; BLE w/ erythema and warmth extending from below knees to feet- no open ulcerations or wounds noted     Results Reviewed:  I have personally reviewed most recent indicated data and agree with findings including:  [x]  Laboratory  [x]  Radiology  [x]  EKG/Telemetry  []  Pathology  []  Cardiac/Vascular Studies  []  Old records  []  Other:  Most pertinent findings include:    LAB RESULTS:      Lab 06/02/21 2131   WBC 7.70   HEMOGLOBIN 11.8*   HEMATOCRIT 37.9   PLATELETS 262   NEUTROS ABS 4.89   IMMATURE GRANS (ABS) 0.02   LYMPHS ABS 1.68   MONOS ABS 0.68   EOS ABS 0.39   MCV 98.7*         Lab 06/02/21  2131   SODIUM 143   POTASSIUM 3.7   CHLORIDE 104   CO2 30.0*   ANION GAP 9.0   BUN 9   CREATININE 0.96   GLUCOSE 101*   CALCIUM 8.9         Lab 06/02/21  2131   TOTAL PROTEIN 6.7   ALBUMIN 4.00   GLOBULIN 2.7   ALT (SGPT) 8   AST (SGOT) 12   BILIRUBIN 0.4   ALK PHOS 140*   LIPASE 7*         Lab 06/02/21  2346 06/02/21  2131   PROBNP  --  3,226.0*   TROPONIN T <0.010 <0.010                 Brief Urine Lab Results     None        Microbiology Results (last 10 days)     Procedure Component Value - Date/Time    COVID PRE-OP / PRE-PROCEDURE SCREENING ORDER (NO ISOLATION) - Swab, Nasopharynx  [[id number]]  (Normal) Collected: 06/03/21 0026    Lab Status: Final result Specimen: Swab from Nasopharynx Updated: 06/03/21 0054    Narrative:      The following orders were created for panel order COVID PRE-OP / PRE-PROCEDURE SCREENING ORDER (NO ISOLATION) - Swab, Nasopharynx.  Procedure                               Abnormality         Status                     ---------                               -----------         ------                     COVID-19 and FLU A/B PCR...[[id number]]  Normal              Final result                 Please view results for these tests on the individual orders.    COVID-19 and FLU A/B PCR - Swab, Nasopharynx [[id number]]  (Normal) Collected: 06/03/21 0026    Lab Status: Final result Specimen: Swab from Nasopharynx Updated: 06/03/21 0054     COVID19 Not Detected     Influenza A PCR Not Detected     Influenza B PCR Not Detected    Narrative:      Fact sheet for providers: https://www.fda.gov/media/095803/download    Fact sheet for patients: https://www.fda.gov/media/189072/download    Test performed by PCR.          XR Chest 1 View    Result Date: 6/2/2021  CR Chest 1 Vw INDICATION: Left-sided chest pain radiating to neck today COMPARISON:  None available. FINDINGS: Single portable AP view(s) of the chest.  The heart and mediastinal contours are normal. The lungs are clear. No pneumothorax or pleural effusion.     Impression: No acute cardiopulmonary findings. Signer Name: Brian Reid MD  Signed: 6/2/2021 8:26 PM  Workstation Name: [email or web address]  [facility]    CT Angiogram Chest    Result Date: 6/2/2021  CTA Chest INDICATION: Chest pain, dissection protocol TECHNIQUE: CT angiogram of the chest with IV contrast. 3-D reconstructions were obtained and reviewed.   Radiation dose reduction techniques included automated exposure control or exposure modulation based on body size. Count of known CT and cardiac nuc med studies performed in previous 12 months: 0.  "COMPARISON: None available. FINDINGS: No evidence of pulmonary embolism. The thoracic aorta appears unremarkable. There is no evidence of dissection. Postsurgical changes are seen at the GE junction and there is recurrent/residual hiatal hernia. Fluid level is seen in patients upper thoracic esophagus. Cardiac and mediastinal structures appear within normal limits. There are scattered areas of groundglass probable atelectasis with no definite concerning focal consolidation. No pneumothorax or effusion. Upper abdominal structures appear within normal limits. With no acute osseous abnormality.     Impression: 1. Negative for pulmonary embolus. 2. No acute findings in the chest. 3. Postsurgical changes are seen at the GE junction and there is recurrent/residual hiatal hernia. Signer Name: Hannah Novak MD  Signed: 6/2/2021 11:43 PM  Workstation Name: [id number]  [facility]          Assessment/Plan   Assessment & Plan       Chest pain    HTN (hypertension)    Asthma    Obesity, morbid, BMI 50 or higher (CMS/HCC)    Atrial fibrillation (CMS/HCC)    Cellulitis    Randa Hermosillo is a 60 y.o. female w/ a hx of HTN, asthma, remote tamika-en-y surgery who presented to the ED w/ c/o chest pain.     **Chest pain   **New onset Atrial fibrillation    -sudden onset left sided chest pain w/ radiation to left shoulder and left neck w/ associated dyspnea, onset at rest, at worst rated 8.5/10; resolved w/ ASA and Nitro PTA  -\"normal\" cardiac stress test ~ 5 yrs ago; has never had a cardiac cath; no known hx of A.Fib   -troponin WNL; trend   -initial EKG c/w NSR; repeat EKG in ED c/w atrial fibrillation w/ rate in the 90's   -repeat EKG pending   -CXR unremarkable   -CTA Chest without acute findings   -nitroglycerin infusion initiated for BP control   -initiate heparin infusion per protocol   -initiate Cardizem infusion if rate becomes uncontrolled (currently controlled in the 70's)  -NPO after MN  -symptom mgt- " currently chest pain free   -tsh, lipid panel, hem a1c w/ am labs   -pre-procedure COVID pending   -low dose statin started   -Cardiology consult this morning      **BLE cellulitis vs chronic stasis dermatitis   -BLE w/ erythema and warmth- pt denies change from baseline  -lactic and procal pending   -Doxycyline BID x 5 days started empirically but likely can DC if wbc and procal remain normal.  -consider WOC if ulcerations develop     **Asthma  -CTA Chest without acute findings  -currently on room air; faint wheezing on exam  -duo nebs scheduled and PRN   -monitor    DVT prophylaxis:  Heparin     CODE STATUS:  Full code  Code Status and Medical Interventions:   Ordered at: 06/03/21 0104     Code Status:    CPR     Medical Interventions (Level of Support Prior to Arrest):    Full     This note has been completed as part of a split-shared workflow.     Signature: Electronically signed by OSWALDO Bowman, 06/03/21, 1:20 AM EDT.'        Attending   Admission Attestation       I have seen and examined the patient, performing an independent face-to-face diagnostic evaluation with plan of care reviewed and developed with the advanced practice clinician (APC).      Brief Summary Statement:   Randa Hermosillo is a 60 y.o. female past medical history of Christoph-en-Y, asthma, hypertension presents to the ER with complaints of chest pain.    Patient reports sudden onset of left-sided chest pain today while at rest.  She reports started on her left side radiates to her left shoulder and left side of her neck.  Initially 8 out of 10 in severity.  Now absent status post aspirin and nitro given by EMS.  She reports associated dyspnea, no diaphoresis, no vomiting.    Patient reports prior history of stress test 5 years ago which was normal.  She denies any history of PE, syncope, or similar chest pain in the past.    Initial EKG showing normal sinus rhythm, however on repeat did show paroxysmal episode of atrial fibrillation.   Patient denies prior history of this.  She has remained rate controlled.  Anticoagulation with heparin drip has been started.  Cardiology has been consulted for consideration of stress versus cardiac catheterization    Remainder of detailed HPI is as noted by APC and has been reviewed and/or edited by me for completeness.    Attending Physical Exam:  Constitutional: Awake, alert  Eyes: PERRLA, sclerae anicteric, no conjunctival injection  HENT: NCAT, mucous membranes moist  Neck: Supple, no thyromegaly, no lymphadenopathy, trachea midline  Respiratory: Clear to auscultation bilaterally, nonlabored respirations   Cardiovascular: RRR, no murmurs, rubs, or gallops, palpable pedal pulses bilaterally  Gastrointestinal: Positive bowel sounds, soft, nontender, nondistended  Musculoskeletal: Significant bilateral lymphedema/edema to the lower extremities with redness and increased warmth but nontender, no clubbing or cyanosis to extremities  Psychiatric: Appropriate affect, cooperative  Neurologic: Oriented x 3, strength symmetric in all extremities, Cranial Nerves grossly intact to confrontation, speech clear  Skin: No rashes      Brief Assessment/Plan :  See detailed assessment and plan developed with APC which I have reviewed and/or edited for completeness.        Admission Status: I believe that this patient meets OBSERVATION status, however if further evaluation or treatment plans warrant, status may change.  Based upon current information, I predict patient's care encounter to be less than or equal to 2 midnights.        Mervin Avendaño DO  06/03/21

## 2021-06-03 NOTE — PROGRESS NOTES
HEPARIN INFUSION  Randa Hermosillo is a  60 y.o. female receiving heparin infusion.        Therapy for (VTE/Cardiac):   ACS and Afib  Patient Weight: 183 kg  Initial Bolus (Y/N):   Yes  Any Bolus (Y/N):   Yes        Signs or Symptoms of Bleeding: No  Cardiac or Other (Not VTE)   Initial Bolus: 60 units/kg (Max 4,000 units)  Initial rate: 12 units/kg/hr (Max 1,000 units/hr)   Anti-Xa (IU/mL) Bolus Dose Stop Infusion Rate Change Repeat Anti-Xa    ?0.19 60 units/kg 0 hrs Increase rate by   4 units/kg/hr 6 hrs    0.2 - 0.29  30 units/kg 0 hrs Increase rate by   2 units/kg/hr 6 hrs    0.3 - 0.7 0 0 hrs No change 6 hrs    0.71 - 0.99 0  0 hrs Decrease rate by   2 units/kg/hr 6 hrs          ?1 0 Hold 1 hr Decrease rate by   3 units/kg/hr 6 hrs      Results from last 7 days   Lab Units 06/03/21  0403 06/03/21  0205 06/02/21  2131   INR   --  1.06  --    HEMOGLOBIN g/dL 10.6* 11.0* 11.8*   HEMATOCRIT % 34.0 36.0 37.9   PLATELETS 10*3/mm3 231 251 262       Date   Time   Anti-Xa Current Rate (Unit/kg/hr) Bolus   (Units) Rate Change   (Unit/kg/hr) New Rate (Unit/kg/hr) Next   Anti-Xa Comments  Pump Check Daily   6/3 01:15 0.1 0 4000 +5.5 5.5 0800 New start in ED   6/3 07:34 0.1 5.5 8000 +2.5 8 1500 DW Jeimy Palacios, PharmD  Pharmacy Resident  6/3/2021  08:55 EDT

## 2021-06-03 NOTE — PLAN OF CARE
Goal Outcome Evaluation:  Plan of Care Reviewed With: patient  Progress: no change   Pt admitted to floor.

## 2021-06-03 NOTE — NURSING NOTE
WOC consulted for: coccyx and folds    Assessment and Care provided: Patient presents with yeast in her abdominal folds, under her bilateral breasts and at her bilateral groin.  Her bottom and coccyx are intact and blanchable.     Bariatric foam bed ordered.    Recommendation(s): Will order Micotin powder BID for yeast. See woc orders for application/removal of powder    *Maintain good skin care, keep dry, turn q 2 hr, keep heels elevated and offloaded with heel boots.    *Apply z-guard to bottom and bony prominences daily and as needed with incontinence episodes.  *Follow C.A.R.E protocol if medical devices (Bipap, cortes, Ng tube, etc) are being used.     All skin interventions in place.  Head to toe assessment completed. Heels and bottom blanchable, intact and offloaded. WOC orders placed.    Discussed plan of care with  RN.    Thank you for consulting WOCN.  Will  Sign off.  Please contact with questions or if needs arise.

## 2021-06-03 NOTE — PROGRESS NOTES
Brief cardiology update  -Stress test without evidence of ischemia  -Ejection fraction measured mildly to moderately decreased on the stress test with an EF of 38% at rest. This finding may be artifactual though in the setting of occasional ectopy throughout the stress test, which may have affected the EF measurement  -Transthoracic echocardiogram ordered to assess ejection fraction  -If EF is mildly to moderately decreased, will need to consider medical therapy. Would first start with a beta-blocker in light of her occasional ectopy/short run of SVT noted on telemetry  -Continue aspirin  -If echocardiogram is unremarkable, will have the patient wear heart monitor as an outpatient. Will call the patient with her heart monitor results once completed and determine the need for cardiac follow-up thereafter  -Outpatient heart monitor tentatively ordered in the discharge navigator  -Dr Dial covering for Dr. Cat tomorrow in regard to this patient's cardiac care    Noe Cat MD, MSc, FACC, [facility]  Interventional Cardiology  [facility]

## 2021-06-03 NOTE — CONSULTS
[facility]   [address], [address]  [place], [place], [id number] [phone]  [email or web address]           INPATIENT CONSULTATION NOTE    Patient Care Team:  Patient Care Team:  Shruti Pacheco MD as PCP - General (Family Medicine)    Requesting Provider and Reason for consultation: The patient is being seen today at the request of Dr. Muhammad for chest pain.     Chief complaint:   Chief Complaint   Patient presents with   • Chest Pain            HPI:    Randa Hermosillo is a 60 y.o. female.    Partial problem list, including cardiac problems:  1. Chest pain  a. Presented to [facility] ED 6/30/2021 with complaints of chest pain.  2. Hypertension  3. Asthma  4. Morbid obesity  a. Note Christoph-en-Y    The patient presented to [facility] ED with complaints of acute onset left-sided chest pain with radiation to the left shoulder and neck while at rest.  She also had associated dyspnea.  She has had worsening dyspnea over the past few months, but this occurs often with her allergy induced asthma.  She contacted EMS, but notes prior to their arrival her pain had subsided.  She reports her pain crescendoed before resolving entirely.  Her EKGs have been without acute ischemic changes.  Troponins have been negative.  CTA of the chest without acute findings.  She reports having a normal stress test years ago that was ordered by her PCP.  She is currently chest pain-free.  She has complaints of persistent lower extremity edema.  She denies palpitations, lightheadedness, syncope, orthopnea, or PND.    She denies any familial history of CAD.  She denies tobacco or drug use.  She drinks alcohol rarely.  She reports she has recently been fairly sedentary and has gained approximately 75 pounds over the past several months.    Review of Systems:  Positive for chest pain, dyspnea, lower extremity edema  All other systems reviewed and are negative.    PFSH:  Patient Active Problem List   Diagnosis   • Chest  pain   • HTN (hypertension)   • Asthma   • Obesity, morbid, BMI 50 or higher (CMS/HCC)   • Atrial fibrillation (CMS/[facility])   • Cellulitis       No current facility-administered medications on file prior to encounter.     Current Outpatient Medications on File Prior to Encounter   Medication Sig Dispense Refill   • HYDROcodone-acetaminophen (NORCO) 7.5-325 MG per tablet Take 2 tablets by mouth 2 (two) times a day.     • Mirabegron ER (MYRBETRIQ) 25 MG tablet sustained-release 24 hour 24 hr tablet Take 25 mg by mouth Daily.       Allergies   Allergen Reactions   • Percocet [Oxycodone-Acetaminophen] Nausea And Vomiting       Social History     Socioeconomic History   • Marital status:      Spouse name: Not on file   • Number of children: Not on file   • Years of education: Not on file   • Highest education level: Not on file   Tobacco Use   • Smoking status: Never Smoker   • Smokeless tobacco: Never Used   Substance and Sexual Activity   • Alcohol use: Never   • Drug use: Never   • Sexual activity: Defer     Family History   Problem Relation Age of Onset   • Heart failure Mother    • COPD Father             Objective:     Vital Sign Min/Max for last 24 hours  Temp  Min: 97.8 °F (36.6 °C)  Max: 98.1 °F (36.7 °C)   BP  Min: 90/71  Max: 157/91   Pulse  Min: 89  Max: 98   Resp  Min: 16  Max: 20   SpO2  Min: 75 %  Max: 99 %   Flow (L/min)  Min: 2  Max: 2      Intake/Output Summary (Last 24 hours) at 6/3/2021 1025  Last data filed at 6/3/2021 0600  Gross per 24 hour   Intake 34.7 ml   Output 400 ml   Net -365.3 ml           Vitals:    06/03/21 1000   BP:    Pulse: 93   Resp:    Temp:    SpO2: 94%       CONSTITUTIONAL: Morbidly obese. In no acute distress.   SKIN: Warm and dry. No rashes noted. Venous stasis dermatitis bilaterally  HEENT: Head is normocephalic and atraumatic. Mucous membranes are pink and moist.   NECK: Supple without masses or thyromegaly.   LUNGS: Normal effort. Clear to auscultation bilaterally  without wheezing, rhonchi, or rales noted.   CARDIOVASCULAR: The heart has a regular rate and rhythm with a normal S1 and S2. There is no murmur, gallop, rub, or click appreciated.   PERIPHERAL VASCULAR: Carotid upstroke is 2+ bilaterally and without bruits. Radial pulses are 2+ bilaterally.  Vela pedis pulses are 2+ and symmetrical. There is bilateral lower extremity edema.   ABDOMEN: Normal bowel sounds.  Soft with no tenderness with palpitation. No hepatosplenomegaly  MUSCULOSKELETAL:  No digital cyanosis  NEUROLOGICAL: Nonfocal.  PSYCHIATRIC: Alert, orientated x 3, appropriate affect and mood    Labs, results reviewed by myself:  Lab Results   Component Value Date    TROPONINT <0.010 06/03/2021       Glucose   Date Value Ref Range Status   06/03/2021 97 65 - 99 mg/dL Final     BUN   Date Value Ref Range Status   06/03/2021 8 8 - 23 mg/dL Final     Creatinine   Date Value Ref Range Status   06/03/2021 0.88 0.57 - 1.00 mg/dL Final     Sodium   Date Value Ref Range Status   06/03/2021 139 136 - 145 mmol/L Final     Potassium   Date Value Ref Range Status   06/03/2021 3.6 3.5 - 5.2 mmol/L Final     Chloride   Date Value Ref Range Status   06/03/2021 104 98 - 107 mmol/L Final     CO2   Date Value Ref Range Status   06/03/2021 28.0 22.0 - 29.0 mmol/L Final     Calcium   Date Value Ref Range Status   06/03/2021 8.1 (L) 8.6 - 10.5 mg/dL Final     Total Protein   Date Value Ref Range Status   06/02/2021 6.7 6.0 - 8.5 g/dL Final     Albumin   Date Value Ref Range Status   06/02/2021 4.00 3.50 - 5.20 g/dL Final     ALT (SGPT)   Date Value Ref Range Status   06/02/2021 8 1 - 33 U/L Final     AST (SGOT)   Date Value Ref Range Status   06/02/2021 12 1 - 32 U/L Final     Alkaline Phosphatase   Date Value Ref Range Status   06/02/2021 140 (H) 39 - 117 U/L Final     Total Bilirubin   Date Value Ref Range Status   06/02/2021 0.4 0.0 - 1.2 mg/dL Final     eGFR Non  Amer   Date Value Ref Range Status   06/03/2021 65 >60  mL/min/1.73 Final     BUN/Creatinine Ratio   Date Value Ref Range Status   06/03/2021 9.1 7.0 - 25.0 Final     Anion Gap   Date Value Ref Range Status   06/03/2021 7.0 5.0 - 15.0 mmol/L Final       Lab Results   Component Value Date    CHOL 124 06/03/2021     Lab Results   Component Value Date    TRIG 55 06/03/2021     Lab Results   Component Value Date    HDL 41 06/03/2021     Lab Results   Component Value Date    LDL 71 06/03/2021     No components found for: LDLDIRECTC      WBC   Date Value Ref Range Status   06/03/2021 7.16 3.40 - 10.80 10*3/mm3 Final     RBC   Date Value Ref Range Status   06/03/2021 3.43 (L) 3.77 - 5.28 10*6/mm3 Final     Hemoglobin   Date Value Ref Range Status   06/03/2021 10.6 (L) 12.0 - 15.9 g/dL Final     Hematocrit   Date Value Ref Range Status   06/03/2021 34.0 34.0 - 46.6 % Final     MCV   Date Value Ref Range Status   06/03/2021 99.1 (H) 79.0 - 97.0 fL Final     MCH   Date Value Ref Range Status   06/03/2021 30.9 26.6 - 33.0 pg Final     MCHC   Date Value Ref Range Status   06/03/2021 31.2 (L) 31.5 - 35.7 g/dL Final     RDW   Date Value Ref Range Status   06/03/2021 13.2 12.3 - 15.4 % Final     RDW-SD   Date Value Ref Range Status   06/03/2021 47.9 37.0 - 54.0 fl Final     MPV   Date Value Ref Range Status   06/03/2021 10.0 6.0 - 12.0 fL Final     Platelets   Date Value Ref Range Status   06/03/2021 231 140 - 450 10*3/mm3 Final     Neutrophil %   Date Value Ref Range Status   06/03/2021 56.7 42.7 - 76.0 % Final     Lymphocyte %   Date Value Ref Range Status   06/03/2021 25.7 19.6 - 45.3 % Final     Monocyte %   Date Value Ref Range Status   06/03/2021 11.3 5.0 - 12.0 % Final     Eosinophil %   Date Value Ref Range Status   06/03/2021 5.6 0.3 - 6.2 % Final     Basophil %   Date Value Ref Range Status   06/03/2021 0.6 0.0 - 1.5 % Final     Immature Grans %   Date Value Ref Range Status   06/03/2021 0.1 0.0 - 0.5 % Final     Neutrophils, Absolute   Date Value Ref Range Status    06/03/2021 4.06 1.70 - 7.00 10*3/mm3 Final     Lymphocytes, Absolute   Date Value Ref Range Status   06/03/2021 1.84 0.70 - 3.10 10*3/mm3 Final     Monocytes, Absolute   Date Value Ref Range Status   06/03/2021 0.81 0.10 - 0.90 10*3/mm3 Final     Eosinophils, Absolute   Date Value Ref Range Status   06/03/2021 0.40 0.00 - 0.40 10*3/mm3 Final     Basophils, Absolute   Date Value Ref Range Status   06/03/2021 0.04 0.00 - 0.20 10*3/mm3 Final     Immature Grans, Absolute   Date Value Ref Range Status   06/03/2021 0.01 0.00 - 0.05 10*3/mm3 Final     nRBC   Date Value Ref Range Status   06/03/2021 0.0 0.0 - 0.2 /100 WBC Final         Diagnostic Data:    EKG: Normal sinus rhythm with PACs, nonspecific T wave abnormality         Tele: Normal sinus rhythm         Assessment and Plan:   ASSESSMENT:  1. Chest pain  a. Isolated episode of self-limiting chest pain, occurring at rest, with radiation to the left shoulder, neck, and arm.  b. Troponins have been negative, EKG without acute ischemic changes.  CXR and CT of the chest without acute findings.  2. PACs/SVT  a. EKG from 6/2/2021 at 9:54 PM had a computer reading of atrial fibrillation but is actually sinus rhythm with frequent PACs  3. Hypertension    PLAN:  1. Discontinue heparin drip  2. Start aspirin 81 mg daily  3. Discontinue nitroglycerin drip due to resolution of chest pain  4. Keep NPO for PET stress test.  If stress test is unremarkable would discharge home with 14 day cardiac monitor given unclear etiology of symptoms, multiple PACs on an EKG, as well as short run of SVT on telemetry  5. NS at 100 ml/hr for 6 hours due to prolonged n.p.o. status and thirst.   6. We will see on an as needed basis if stress testing unremarkable.   7. 14-day outpatient heart monitor order placed in the discharge navigator.  Can be placed prior to discharge  8. We will call the patient with the results of her heart monitor  9. If with an arrhythmia, we will arrange follow-up and  consider the addition of metoprolol    Scribed for Dr. Cat by Kim Moreno, APRN. 6/3/2021  13:55 EDT    I, Noe Cat MD, personally performed the services as scribed by the above named individual. I have made any necessary edits and it is both accurate and complete.     Noe Cat MD, MSc, FACC, [facility]  Interventional Cardiology  [facility]

## 2021-06-04 ENCOUNTER — APPOINTMENT (OUTPATIENT)
Dept: CARDIOLOGY | Facility: HOSPITAL | Age: 61
End: 2021-06-04

## 2021-06-04 VITALS
OXYGEN SATURATION: 97 % | DIASTOLIC BLOOD PRESSURE: 69 MMHG | WEIGHT: 293 LBS | SYSTOLIC BLOOD PRESSURE: 144 MMHG | RESPIRATION RATE: 20 BRPM | BODY MASS INDEX: 47.09 KG/M2 | HEIGHT: 66 IN | HEART RATE: 80 BPM | TEMPERATURE: 98.7 F

## 2021-06-04 PROBLEM — I47.1 SVT (SUPRAVENTRICULAR TACHYCARDIA) (HCC): Status: ACTIVE | Noted: 2021-06-04

## 2021-06-04 PROBLEM — R07.9 CHEST PAIN: Status: RESOLVED | Noted: 2021-06-02 | Resolved: 2021-06-04

## 2021-06-04 PROBLEM — I42.8 NICM (NONISCHEMIC CARDIOMYOPATHY) (HCC): Status: ACTIVE | Noted: 2021-06-04

## 2021-06-04 PROBLEM — N39.0 UTI (URINARY TRACT INFECTION) DUE TO URINARY INDWELLING CATHETER (HCC): Status: ACTIVE | Noted: 2021-06-04

## 2021-06-04 PROBLEM — T83.511A UTI (URINARY TRACT INFECTION) DUE TO URINARY INDWELLING CATHETER (HCC): Status: ACTIVE | Noted: 2021-06-04

## 2021-06-04 PROBLEM — L03.90 CELLULITIS: Status: RESOLVED | Noted: 2021-06-03 | Resolved: 2021-06-04

## 2021-06-04 PROBLEM — I47.1 SVT (SUPRAVENTRICULAR TACHYCARDIA) (HCC): Status: RESOLVED | Noted: 2021-06-04 | Resolved: 2021-06-04

## 2021-06-04 LAB
ANION GAP SERPL CALCULATED.3IONS-SCNC: 9 MMOL/L (ref 5–15)
ASCENDING AORTA: 3.5 CM
BH CV ECHO MEAS - AO MAX PG (FULL): 6.7 MMHG
BH CV ECHO MEAS - AO MAX PG: 11.4 MMHG
BH CV ECHO MEAS - AO MEAN PG (FULL): 4 MMHG
BH CV ECHO MEAS - AO MEAN PG: 7 MMHG
BH CV ECHO MEAS - AO ROOT AREA (BSA CORRECTED): 1.1
BH CV ECHO MEAS - AO ROOT AREA: 7.1 CM^2
BH CV ECHO MEAS - AO ROOT DIAM: 3 CM
BH CV ECHO MEAS - AO V2 MAX: 169 CM/SEC
BH CV ECHO MEAS - AO V2 MEAN: 125 CM/SEC
BH CV ECHO MEAS - AO V2 VTI: 32.9 CM
BH CV ECHO MEAS - ASC AORTA: 3.5 CM
BH CV ECHO MEAS - AVA(I,A): 2.2 CM^2
BH CV ECHO MEAS - AVA(I,D): 2.2 CM^2
BH CV ECHO MEAS - AVA(V,A): 2 CM^2
BH CV ECHO MEAS - AVA(V,D): 2 CM^2
BH CV ECHO MEAS - BSA(HAYCOCK): 3 M^2
BH CV ECHO MEAS - BSA: 2.7 M^2
BH CV ECHO MEAS - BZI_BMI: 65 KILOGRAMS/M^2
BH CV ECHO MEAS - BZI_METRIC_HEIGHT: 167.6 CM
BH CV ECHO MEAS - BZI_METRIC_WEIGHT: 182.8 KG
BH CV ECHO MEAS - EDV(CUBED): 133.4 ML
BH CV ECHO MEAS - EDV(MOD-SP2): 113 ML
BH CV ECHO MEAS - EDV(MOD-SP4): 116 ML
BH CV ECHO MEAS - EDV(TEICH): 124.4 ML
BH CV ECHO MEAS - EF(CUBED): 15 %
BH CV ECHO MEAS - EF(MOD-SP2): 55.8 %
BH CV ECHO MEAS - EF(MOD-SP4): 47.4 %
BH CV ECHO MEAS - EF(TEICH): 11.9 %
BH CV ECHO MEAS - ESV(CUBED): 113.4 ML
BH CV ECHO MEAS - ESV(MOD-SP2): 50 ML
BH CV ECHO MEAS - ESV(MOD-SP4): 61 ML
BH CV ECHO MEAS - ESV(TEICH): 109.6 ML
BH CV ECHO MEAS - FS: 5.3 %
BH CV ECHO MEAS - IVS/LVPW: 0.87
BH CV ECHO MEAS - IVSD: 0.93 CM
BH CV ECHO MEAS - LA DIMENSION: 4.2 CM
BH CV ECHO MEAS - LA/AO: 1.4
BH CV ECHO MEAS - LAD MAJOR: 5.7 CM
BH CV ECHO MEAS - LAT PEAK E' VEL: 12.3 CM/SEC
BH CV ECHO MEAS - LATERAL E/E' RATIO: 10.8
BH CV ECHO MEAS - LV DIASTOLIC VOL/BSA (35-75): 43.1 ML/M^2
BH CV ECHO MEAS - LV IVRT: 0.07 SEC
BH CV ECHO MEAS - LV MASS(C)D: 188.4 GRAMS
BH CV ECHO MEAS - LV MASS(C)DI: 69.9 GRAMS/M^2
BH CV ECHO MEAS - LV MAX PG: 4.8 MMHG
BH CV ECHO MEAS - LV MEAN PG: 3 MMHG
BH CV ECHO MEAS - LV SYSTOLIC VOL/BSA (12-30): 22.6 ML/M^2
BH CV ECHO MEAS - LV V1 MAX: 109 CM/SEC
BH CV ECHO MEAS - LV V1 MEAN: 81.9 CM/SEC
BH CV ECHO MEAS - LV V1 VTI: 22.8 CM
BH CV ECHO MEAS - LVIDD: 5.6 CM
BH CV ECHO MEAS - LVIDS: 4.8 CM
BH CV ECHO MEAS - LVLD AP2: 7.5 CM
BH CV ECHO MEAS - LVLD AP4: 8.2 CM
BH CV ECHO MEAS - LVLS AP2: 6.3 CM
BH CV ECHO MEAS - LVLS AP4: 7.1 CM
BH CV ECHO MEAS - LVOT AREA (M): 3.1 CM^2
BH CV ECHO MEAS - LVOT AREA: 3.1 CM^2
BH CV ECHO MEAS - LVOT DIAM: 2 CM
BH CV ECHO MEAS - LVPWD: 1.1 CM
BH CV ECHO MEAS - MV A MAX VEL: 120 CM/SEC
BH CV ECHO MEAS - MV DEC TIME: 0.15 SEC
BH CV ECHO MEAS - MV E MAX VEL: 133 CM/SEC
BH CV ECHO MEAS - MV E/A: 1.1
BH CV ECHO MEAS - MV MAX PG: 7 MMHG
BH CV ECHO MEAS - MV MEAN PG: 4 MMHG
BH CV ECHO MEAS - MV V2 MAX: 150 CM/SEC
BH CV ECHO MEAS - MV V2 MEAN: 114 CM/SEC
BH CV ECHO MEAS - MV V2 VTI: 25 CM
BH CV ECHO MEAS - MVA(VTI): 2.3 CM^2
BH CV ECHO MEAS - PA ACC SLOPE: 675.5 CM/SEC^2
BH CV ECHO MEAS - PA ACC TIME: 0.12 SEC
BH CV ECHO MEAS - PA MAX PG: 8.1 MMHG
BH CV ECHO MEAS - PA PR(ACCEL): 23.7 MMHG
BH CV ECHO MEAS - PA V2 MAX: 142 CM/SEC
BH CV ECHO MEAS - RAP SYSTOLE: 8 MMHG
BH CV ECHO MEAS - RVSP: 44 MMHG
BH CV ECHO MEAS - SI(AO): 86.3 ML/M^2
BH CV ECHO MEAS - SI(CUBED): 7.4 ML/M^2
BH CV ECHO MEAS - SI(LVOT): 26.6 ML/M^2
BH CV ECHO MEAS - SI(MOD-SP2): 23.4 ML/M^2
BH CV ECHO MEAS - SI(MOD-SP4): 20.4 ML/M^2
BH CV ECHO MEAS - SI(TEICH): 5.5 ML/M^2
BH CV ECHO MEAS - SV(AO): 232.6 ML
BH CV ECHO MEAS - SV(CUBED): 20.1 ML
BH CV ECHO MEAS - SV(LVOT): 71.6 ML
BH CV ECHO MEAS - SV(MOD-SP2): 63 ML
BH CV ECHO MEAS - SV(MOD-SP4): 55 ML
BH CV ECHO MEAS - SV(TEICH): 14.8 ML
BH CV ECHO MEAS - TAPSE (>1.6): 2.8 CM
BH CV ECHO MEAS - TR MAX PG: 36 MMHG
BH CV ECHO MEAS - TR MAX VEL: 299 CM/SEC
BH CV VAS BP LEFT ARM: NORMAL MMHG
BH CV XLRA - RV BASE: 4.1 CM
BH CV XLRA - RV LENGTH: 8.4 CM
BH CV XLRA - RV MID: 3.5 CM
BH CV XLRA - TDI S': 14.3 CM/SEC
BUN SERPL-MCNC: 9 MG/DL (ref 8–23)
BUN/CREAT SERPL: 10 (ref 7–25)
CALCIUM SPEC-SCNC: 8.4 MG/DL (ref 8.6–10.5)
CHLORIDE SERPL-SCNC: 106 MMOL/L (ref 98–107)
CO2 SERPL-SCNC: 26 MMOL/L (ref 22–29)
CREAT SERPL-MCNC: 0.9 MG/DL (ref 0.57–1)
GFR SERPL CREATININE-BSD FRML MDRD: 63 ML/MIN/1.73
GLUCOSE SERPL-MCNC: 109 MG/DL (ref 65–99)
IVRT: 70 MSEC
LEFT ATRIUM VOLUME INDEX: 32.7 ML/M^2
LEFT ATRIUM VOLUME: 88 ML
LV EF 2D ECHO EST: 45 %
MAGNESIUM SERPL-MCNC: 1.8 MG/DL (ref 1.6–2.4)
MAXIMAL PREDICTED HEART RATE: 158 BPM
POTASSIUM SERPL-SCNC: 3.7 MMOL/L (ref 3.5–5.2)
QT INTERVAL: 380 MS
QT INTERVAL: 390 MS
QTC INTERVAL: 472 MS
QTC INTERVAL: 495 MS
SODIUM SERPL-SCNC: 141 MMOL/L (ref 136–145)
STRESS TARGET HR: 134 BPM

## 2021-06-04 PROCEDURE — G0378 HOSPITAL OBSERVATION PER HR: HCPCS

## 2021-06-04 PROCEDURE — 93306 TTE W/DOPPLER COMPLETE: CPT

## 2021-06-04 PROCEDURE — 93306 TTE W/DOPPLER COMPLETE: CPT | Performed by: INTERNAL MEDICINE

## 2021-06-04 PROCEDURE — 99217 PR OBSERVATION CARE DISCHARGE MANAGEMENT: CPT | Performed by: INTERNAL MEDICINE

## 2021-06-04 PROCEDURE — 25010000002 SULFUR HEXAFLUORIDE MICROSPH 60.7-25 MG RECONSTITUTED SUSPENSION: Performed by: INTERNAL MEDICINE

## 2021-06-04 PROCEDURE — 80048 BASIC METABOLIC PNL TOTAL CA: CPT | Performed by: INTERNAL MEDICINE

## 2021-06-04 PROCEDURE — 96366 THER/PROPH/DIAG IV INF ADDON: CPT

## 2021-06-04 PROCEDURE — 83735 ASSAY OF MAGNESIUM: CPT | Performed by: INTERNAL MEDICINE

## 2021-06-04 PROCEDURE — 99214 OFFICE O/P EST MOD 30 MIN: CPT | Performed by: INTERNAL MEDICINE

## 2021-06-04 PROCEDURE — 25010000002 CEFTRIAXONE PER 250 MG: Performed by: INTERNAL MEDICINE

## 2021-06-04 PROCEDURE — 94799 UNLISTED PULMONARY SVC/PX: CPT

## 2021-06-04 RX ORDER — CEFUROXIME AXETIL 250 MG/1
250 TABLET ORAL 2 TIMES DAILY
Qty: 6 TABLET | Refills: 0 | Status: SHIPPED | OUTPATIENT
Start: 2021-06-04 | End: 2021-06-07

## 2021-06-04 RX ORDER — ASPIRIN 81 MG/1
81 TABLET ORAL DAILY
Qty: 30 TABLET | Refills: 1 | Status: SHIPPED | OUTPATIENT
Start: 2021-06-05 | End: 2021-06-04

## 2021-06-04 RX ORDER — CALCIUM CARBONATE 200(500)MG
2 TABLET,CHEWABLE ORAL ONCE AS NEEDED
Status: COMPLETED | OUTPATIENT
Start: 2021-06-04 | End: 2021-06-04

## 2021-06-04 RX ORDER — ATORVASTATIN CALCIUM 20 MG/1
20 TABLET, FILM COATED ORAL NIGHTLY
Qty: 30 TABLET | Refills: 1 | Status: SHIPPED | OUTPATIENT
Start: 2021-06-04 | End: 2021-07-04

## 2021-06-04 RX ORDER — CEFUROXIME AXETIL 250 MG/1
250 TABLET ORAL 2 TIMES DAILY
Qty: 6 TABLET | Refills: 0 | Status: SHIPPED | OUTPATIENT
Start: 2021-06-04 | End: 2021-06-04 | Stop reason: SDUPTHER

## 2021-06-04 RX ORDER — TRAMADOL HYDROCHLORIDE 50 MG/1
50 TABLET ORAL ONCE AS NEEDED
Status: COMPLETED | OUTPATIENT
Start: 2021-06-04 | End: 2021-06-04

## 2021-06-04 RX ORDER — METOPROLOL SUCCINATE 25 MG/1
25 TABLET, EXTENDED RELEASE ORAL
Qty: 30 TABLET | Refills: 1 | Status: SHIPPED | OUTPATIENT
Start: 2021-06-04

## 2021-06-04 RX ORDER — ASPIRIN 81 MG/1
81 TABLET ORAL DAILY
Qty: 30 TABLET | Refills: 1 | Status: SHIPPED | OUTPATIENT
Start: 2021-06-05

## 2021-06-04 RX ORDER — VALSARTAN 40 MG/1
40 TABLET ORAL
Qty: 30 TABLET | Refills: 1 | Status: SHIPPED | OUTPATIENT
Start: 2021-06-04 | End: 2021-06-04

## 2021-06-04 RX ORDER — METOPROLOL SUCCINATE 25 MG/1
25 TABLET, EXTENDED RELEASE ORAL
Status: DISCONTINUED | OUTPATIENT
Start: 2021-06-04 | End: 2021-06-04 | Stop reason: HOSPADM

## 2021-06-04 RX ORDER — VALSARTAN 80 MG/1
40 TABLET ORAL
Status: DISCONTINUED | OUTPATIENT
Start: 2021-06-04 | End: 2021-06-04 | Stop reason: HOSPADM

## 2021-06-04 RX ORDER — VALSARTAN 40 MG/1
40 TABLET ORAL
Qty: 30 TABLET | Refills: 1 | Status: SHIPPED | OUTPATIENT
Start: 2021-06-04

## 2021-06-04 RX ORDER — ATORVASTATIN CALCIUM 20 MG/1
20 TABLET, FILM COATED ORAL NIGHTLY
Qty: 30 TABLET | Refills: 1 | Status: SHIPPED | OUTPATIENT
Start: 2021-06-04 | End: 2021-06-04

## 2021-06-04 RX ORDER — TRAMADOL HYDROCHLORIDE 50 MG/1
50 TABLET ORAL ONCE
Status: COMPLETED | OUTPATIENT
Start: 2021-06-04 | End: 2021-06-04

## 2021-06-04 RX ORDER — METOPROLOL SUCCINATE 25 MG/1
25 TABLET, EXTENDED RELEASE ORAL
Qty: 30 TABLET | Refills: 1 | Status: SHIPPED | OUTPATIENT
Start: 2021-06-04 | End: 2021-06-04

## 2021-06-04 RX ADMIN — IPRATROPIUM BROMIDE AND ALBUTEROL SULFATE 3 ML: 2.5; .5 SOLUTION RESPIRATORY (INHALATION) at 19:34

## 2021-06-04 RX ADMIN — ACETAMINOPHEN 650 MG: 325 TABLET ORAL at 09:49

## 2021-06-04 RX ADMIN — ANTACID TABLETS 2 TABLET: 500 TABLET, CHEWABLE ORAL at 18:27

## 2021-06-04 RX ADMIN — VALSARTAN 40 MG: 80 TABLET, FILM COATED ORAL at 12:05

## 2021-06-04 RX ADMIN — OXYBUTYNIN CHLORIDE 5 MG: 5 TABLET, EXTENDED RELEASE ORAL at 09:50

## 2021-06-04 RX ADMIN — IPRATROPIUM BROMIDE AND ALBUTEROL SULFATE 3 ML: 2.5; .5 SOLUTION RESPIRATORY (INHALATION) at 12:22

## 2021-06-04 RX ADMIN — TRAMADOL HYDROCHLORIDE 50 MG: 50 TABLET, FILM COATED ORAL at 18:27

## 2021-06-04 RX ADMIN — SULFUR HEXAFLUORIDE 3 ML: KIT at 09:15

## 2021-06-04 RX ADMIN — SODIUM CHLORIDE, PRESERVATIVE FREE 10 ML: 5 INJECTION INTRAVENOUS at 09:50

## 2021-06-04 RX ADMIN — SODIUM CHLORIDE 1 G: 900 INJECTION INTRAVENOUS at 09:49

## 2021-06-04 RX ADMIN — METOPROLOL SUCCINATE 25 MG: 25 TABLET, EXTENDED RELEASE ORAL at 12:06

## 2021-06-04 RX ADMIN — ASPIRIN 81 MG: 81 TABLET, COATED ORAL at 09:50

## 2021-06-04 RX ADMIN — NYSTATIN: 100000 POWDER TOPICAL at 09:50

## 2021-06-04 RX ADMIN — IPRATROPIUM BROMIDE AND ALBUTEROL SULFATE 3 ML: 2.5; .5 SOLUTION RESPIRATORY (INHALATION) at 07:50

## 2021-06-04 RX ADMIN — TRAMADOL HYDROCHLORIDE 50 MG: 50 TABLET, FILM COATED ORAL at 12:24

## 2021-06-04 NOTE — CASE MANAGEMENT/SOCIAL WORK
Case Management Discharge Note      Final Note: Plan is home with friend in [place]. [place] [facility] will transport. PCS is on chart. Notify CM when patient is ready for transport and CM will call EMS to pick the patient up.         Selected Continued Care - Admitted Since 6/2/2021     Destination    No services have been selected for the patient.              Durable Medical Equipment    No services have been selected for the patient.              Dialysis/Infusion    No services have been selected for the patient.              Home Medical Care    No services have been selected for the patient.              Therapy    No services have been selected for the patient.              Community Resources    No services have been selected for the patient.                       Final Discharge Disposition Code: 01 - home or self-care

## 2021-06-04 NOTE — PROGRESS NOTES
[facility] Medicine Services  PROGRESS NOTE    Patient Name: Randa Hermosillo  : 1960  MRN: [id number]    Date of Admission: 2021  Primary Care Physician: Shruti Pacheco MD    Subjective   Subjective     CC: Chest pain    HPI: Patient said that she did not get much rest, denies any chest pain, however does have back and hip pain.    ROS:  Gen- No fevers, chills  CV- No chest pain, palpitations  Resp- No cough, dyspnea  GI- No N/V/D, abd pain     All other systems reviewed and negative    Objective   Objective     Vital Signs:   Temp:  [98 °F (36.7 °C)-98.7 °F (37.1 °C)] 98.1 °F (36.7 °C)  Heart Rate:  [] 104  Resp:  [16-21] 18  BP: (138-151)/(67-89) 138/67        Physical Exam:  Constitutional: Chronically ill-appearing lady no acute distress, awake, alert  HENT: NCAT, mucous membranes moist  Respiratory: Clear to auscultation bilaterally, respiratory effort normal   Cardiovascular: RRR, no murmurs, rubs, or gallops  Gastrointestinal: Obese, positive bowel sounds, soft, nontender, nondistended  Musculoskeletal: bilateral ankle edema, chronic venous stasis changes   Psychiatric: Appropriate affect, cooperative  Neurologic: Oriented x 3, nonfocal  Skin: No rashes    Results Reviewed:  Results from last 7 days   Lab Units 21  0403 21  0205 21  2131   WBC 10*3/mm3 7.16 7.89 7.70   HEMOGLOBIN g/dL 10.6* 11.0* 11.8*   HEMATOCRIT % 34.0 36.0 37.9   PLATELETS 10*3/mm3 231 251 262   INR   --  1.06  --    PROCALCITONIN ng/mL  --   --  0.05     Results from last 7 days   Lab Units 21  0734 21  0403 21  2346 21  2131   SODIUM mmol/L  --  139  --  143   POTASSIUM mmol/L  --  3.6  --  3.7   CHLORIDE mmol/L  --  104  --  104   CO2 mmol/L  --  28.0  --  30.0*   BUN mg/dL  --  8  --  9   CREATININE mg/dL  --  0.88  --  0.96   GLUCOSE mg/dL  --  97  --  101*   CALCIUM mg/dL  --  8.1*  --  8.9   ALT (SGPT) U/L  --   --   --  8   AST (SGOT) U/L   --   --   --  12   TROPONIN T ng/mL <0.010 <0.010 <0.010 <0.010   PROBNP pg/mL  --   --   --  3,226.0*     Estimated Creatinine Clearance: 114.1 mL/min (by C-G formula based on SCr of 0.88 mg/dL).    Microbiology Results Abnormal     Procedure Component Value - Date/Time    COVID PRE-OP / PRE-PROCEDURE SCREENING ORDER (NO ISOLATION) - Swab, Nasopharynx [[id number]]  (Normal) Collected: 06/03/21 0026    Lab Status: Final result Specimen: Swab from Nasopharynx Updated: 06/03/21 0054    Narrative:      The following orders were created for panel order COVID PRE-OP / PRE-PROCEDURE SCREENING ORDER (NO ISOLATION) - Swab, Nasopharynx.  Procedure                               Abnormality         Status                     ---------                               -----------         ------                     COVID-19 and FLU A/B PCR...[[id number]]  Normal              Final result                 Please view results for these tests on the individual orders.    COVID-19 and FLU A/B PCR - Swab, Nasopharynx [[id number]]  (Normal) Collected: 06/03/21 0026    Lab Status: Final result Specimen: Swab from Nasopharynx Updated: 06/03/21 0054     COVID19 Not Detected     Influenza A PCR Not Detected     Influenza B PCR Not Detected    Narrative:      Fact sheet for providers: https://www.fda.gov/media/016365/download    Fact sheet for patients: https://www.fda.gov/media/699217/download    Test performed by PCR.          Imaging Results (Last 24 Hours)     ** No results found for the last 24 hours. **              I have reviewed the medications:  Scheduled Meds:aspirin, 81 mg, Oral, Daily  atorvastatin, 20 mg, Oral, Nightly  cefTRIAXone, 1 g, Intravenous, Q24H  ipratropium-albuterol, 3 mL, Nebulization, 4x Daily - RT  nystatin, , Topical, Q12H  oxybutynin XL, 5 mg, Oral, Daily  sodium chloride, 10 mL, Intravenous, Q12H      Continuous Infusions:   PRN Meds:.•  acetaminophen  •  ipratropium-albuterol  •  ondansetron **OR** ondansetron  •   sodium chloride  •  sodium chloride    Assessment/Plan   Assessment & Plan     Active Hospital Problems    Diagnosis  POA   • **Chest pain [R07.9]  Yes   • SVT (supraventricular tachycardia) (CMS/[facility]) [I47.1]  Yes   • HTN (hypertension) [I10]  Yes   • Asthma [J45.909]  Yes   • Obesity, morbid, BMI 50 or higher (CMS/[facility]) [E66.01]  Yes   • Cellulitis [L03.90]  Yes      Resolved Hospital Problems   No resolved problems to display.        Brief Hospital Course to date:  Randa Hermosillo is a 60 y.o. female with a history of hypertension, morbid obese obesity s/p Christoph-en-Y, asthma presented to the ED with chest pain with associated dyspnea    Chest pain  -Troponin negative x3, EKG negative for any ischemic changes  -Cardiology has been consulted, patient is s/p stress test that showed no evidence of ischemia, however, her EF is mildly reduced, follow-up TTE, per cardiology, if EF remains depressed medical therapy will be considered, otherwise, if unremarkable plan is to wear heart monitor as outpatient.  -Continue aspirin, statin    Hypertension-currently not on any meds, BP is appropriate    Bilateral lower symmetry venous stasis dermatitis  -Continue wound care    UTI  -UA with 4+ bacteria, urine cultures NGTD, continue IV Rocephin, will transition to p.o. at discharge.    PACs/SVT  -Initially thought to be atrial fibrillation but per cardiology this is sinus rhythm with frequent PACs  -Patient will likely need outpatient heart monitor at discharge.    Anxiety/depression  -Continue Cymbalta as below    Morbid obesity with BMI 65, s/p Christoph-en-Y complicates all aspects of care    DVT Prophylaxis: Mechanical    Disposition: TBD    CODE STATUS:   Code Status and Medical Interventions:   Ordered at: 06/03/21 0104     Code Status:    CPR     Medical Interventions (Level of Support Prior to Arrest):    Full       Ceasar Art MD  06/04/21

## 2021-06-04 NOTE — PROGRESS NOTES
"[facility] Progress Note     LOS: 0 days   Patient Care Team:  Shruti Pacheco MD as PCP - General (Family Medicine)  PCP:  Shruti Pacheco MD    Chief Complaint: Follow-up on chest pain, ectopy    Subjective: The patient is not having chest pain this morning.  Occasional ectopy noted on monitor.  No other complaints.      Review of Systems:   All systems have been reviewed and are negative with the exception of those mentioned above.      Objective:    Vital Sign Min/Max for last 24 hours  Temp  Min: 98.1 °F (36.7 °C)  Max: 98.9 °F (37.2 °C)   BP  Min: 127/59  Max: 151/89   Pulse  Min: 79  Max: 108   Resp  Min: 16  Max: 21   SpO2  Min: 91 %  Max: 96 %   No data recorded   Weight  Min: 183 kg (403 lb)  Max: 183 kg (403 lb)     Flowsheet Rows      First Filed Value   Admission Height  167.6 cm (66\") Documented at 06/02/2021 1955   Admission Weight  (!) 147 kg (324 lb) Documented at 06/02/2021 1955          Telemetry: Sinus rhythm with occasional atrial ventricular ectopy      Intake/Output Summary (Last 24 hours) at 6/4/2021 1023  Last data filed at 6/3/2021 1919  Gross per 24 hour   Intake --   Output 1125 ml   Net -1125 ml     Intake & Output (last 3 days)       06/01 0701 - 06/02 0700 06/02 0701 - 06/03 0700 06/03 0701 - 06/04 0700 06/04 0701 - 06/05 0700    I.V. (mL/kg)  34.7 (0.2)      Total Intake(mL/kg)  34.7 (0.2)      Urine (mL/kg/hr)  400 1625 (0.4)     Total Output  400 1625     Net  -365.3 -1625                    Physical Exam:  Constitutional:       Appearance: Not in distress.   Pulmonary:      Effort: Pulmonary effort is normal.   Cardiovascular:      Regular rhythm.      Murmurs: There is no murmur.      Comments: 1+ edema venous stasis bilaterally  Edema:     Peripheral edema present.  Neurological:      Mental Status: Alert and oriented to person, place and time.          LABS/DIAGNOSTIC DATA:  Results from last 7 days   Lab Units " 06/03/21  0403 06/03/21  0205 06/02/21 2131   WBC 10*3/mm3 7.16 7.89 7.70   HEMOGLOBIN g/dL 10.6* 11.0* 11.8*   HEMATOCRIT % 34.0 36.0 37.9   PLATELETS 10*3/mm3 231 251 262     Lab Results   Lab Value Date/Time    TROPONINT <0.010 06/03/2021 0734    TROPONINT <0.010 06/03/2021 0403    TROPONINT <0.010 06/02/2021 2346    TROPONINT <0.010 06/02/2021 2131     Results from last 7 days   Lab Units 06/03/21  0205   INR  1.06   APTT seconds 27.9*     Results from last 7 days   Lab Units 06/03/21  0403 06/02/21  2131   SODIUM mmol/L 139 143   POTASSIUM mmol/L 3.6 3.7   CHLORIDE mmol/L 104 104   CO2 mmol/L 28.0 30.0*   BUN mg/dL 8 9   CREATININE mg/dL 0.88 0.96   CALCIUM mg/dL 8.1* 8.9   BILIRUBIN mg/dL  --  0.4   ALK PHOS U/L  --  140*   ALT (SGPT) U/L  --  8   AST (SGOT) U/L  --  12   GLUCOSE mg/dL 97 101*     Results from last 7 days   Lab Units 06/03/21  0403   HEMOGLOBIN A1C % 5.20     Results from last 7 days   Lab Units 06/03/21  0403   CHOLESTEROL mg/dL 124   TRIGLYCERIDES mg/dL 55   HDL CHOL mg/dL 41   LDL CHOL mg/dL 71     Results from last 7 days   Lab Units 06/03/21  0403   TSH uIU/mL 2.600           Medication Review:   aspirin, 81 mg, Oral, Daily  atorvastatin, 20 mg, Oral, Nightly  cefTRIAXone, 1 g, Intravenous, Q24H  ipratropium-albuterol, 3 mL, Nebulization, 4x Daily - RT  metoprolol succinate XL, 25 mg, Oral, Q24H  nystatin, , Topical, Q12H  oxybutynin XL, 5 mg, Oral, Daily  sodium chloride, 10 mL, Intravenous, Q12H  valsartan, 40 mg, Oral, Q24H               Chest pain    HTN (hypertension)    Asthma    Obesity, morbid, BMI 50 or higher (CMS/HCC)    Cellulitis    SVT (supraventricular tachycardia) (CMS/HCC)    Stress PET:  · Left ventricular ejection fraction is mildly to moderately reduced (Calculated EF = 38%). Calculation may have been affected by the patient's occasional ectopy throughout the study.  · There were occasional PACs and PVCs throughout the study.  · Myocardial perfusion imaging indicates  a normal myocardial perfusion study with no evidence of ischemia.  · Impressions are consistent with a low risk study.         Echo:  · Estimated left ventricular EF = 45% Left ventricular systolic function is mildly decreased.  · The left ventricular cavity is mildly dilated.  · The right ventricular cavity is borderline dilated. Normal systolic function  · The left atrial cavity is borderline dilated  · Mild mitral annular calcification is present. Mild mitral valve regurgitation is present.  · Mild tricuspid valve regurgitation is present. Estimated right ventricular systolic pressure from tricuspid regurgitation is mildly elevated (35-45 mmHg).    Assessment/Plan:  1. Chest pain  a. Isolated episode of self-limiting chest pain, occurring at rest, with radiation to the left shoulder, neck, and arm.  b. Troponins have been negative, EKG without acute ischemic changes.  CXR and CT of the chest without acute findings.  2. PACs/SVT  a. EKG from 6/2/2021 at 9:54 PM had a computer reading of atrial fibrillation but is actually sinus rhythm with frequent PACs  3. Hypertension  4. Mildly decreased systolic function: EF 45%, nonischemic cardiomyopathy     PLAN:  1. Continue aspirin 81 mg, atorvastatin 20 mg.  2. Stress test did not show evidence of ischemia but there is mildly reduced LV function.  Confirmed on follow-up echo EF estimated  45%  3. We will add Toprol-XL 25 mg for cardiomyopathy and ectopy  4. Add small dose of valsartan 40 mg daily.  5. We will arrange for a Holter monitor to be placed to monitor for arrhythmias and ectopy burden.  6. The patient can be discharged today and we will arrange follow-up in cardiology clinic in 6 weeks with Dr. Cat.  Can further titrate her medical therapy at that time.              Gustavo Dial MD [facility]  06/04/21

## 2021-06-04 NOTE — PLAN OF CARE
Goal Outcome Evaluation:  Plan of Care Reviewed With: patient  Progress: improving  Outcome Summary: Denies chest pain, states she just feels exhausted & wants to rest. Sinus tachycardia w/ rare PVC & occasional PAC. BP stable. S/P stress test. Echocardiogram ordered & pending.  consult re: discharge disposition (pt doesn't know where she will go, & will require ambulance transport).

## 2021-06-04 NOTE — DISCHARGE SUMMARY
[facility] Medicine Services  DISCHARGE SUMMARY    Patient Name: Randa Hermosillo  : 1960  MRN: [id number]    Date of Admission: 2021  7:49 PM  Date of Discharge:  2021  Primary Care Physician: Shruti Pacheco MD    Consults     Date and Time Order Name Status Description    6/3/2021  2:16 AM Inpatient Cardiology Consult Completed           Hospital Course     Presenting Problem:   Chest pain [R07.9]    Active Hospital Problems    Diagnosis  POA   • UTI (urinary tract infection) due to urinary indwelling catheter (CMS/[facility]) [T83.511A, N39.0]  Yes   • NICM (nonischemic cardiomyopathy) (CMS/[facility]) [I42.8]  Yes   • HTN (hypertension) [I10]  Yes   • Asthma [J45.909]  Yes   • Obesity, morbid, BMI 50 or higher (CMS/[facility]) [E66.01]  Yes      Resolved Hospital Problems    Diagnosis Date Resolved POA   • **Chest pain [R07.9] 2021 Yes   • SVT (supraventricular tachycardia) (CMS/[facility]) [I47.1] 2021 Yes   • Cellulitis [L03.90] 2021 Yes          Hospital Course:  Randa Hermosillo is a 60 y.o. female with a history of hypertension, morbid obese obesity s/p Christoph-en-Y, asthma presented to the ED with chest pain with associated dyspnea     Chest pain  Nonischemic cardiomyopathy mildly decreased systolic function  -Troponin negative x3, EKG negative for any ischemic changes  -Cardiology was consulted, patient is s/p stress test that showed no evidence of ischemia, however, her EF is mildly reduced,  TTE did show reduced EF of 45%, she has been started on Toprol, valsartan and will be discharged home with a Holter monitor.Continue aspirin, statin   -Cardiology will follow up    Hypertension-continue Toprol and valsartan as above     Bilateral lower symmetry venous stasis dermatitis  -Continue wound care     UTI  -UA with 4+ bacteria, urine cultures NGTD, s/p IV Rocephin, we will discharge her with Ceftin to complete 5-day course.       PACs/SVT  -Initially thought to be atrial  fibrillation but per cardiology this is sinus rhythm with frequent PACs  -Patient will be discharged with outpatient heart monitor.     Anxiety/depression  -Continue Cymbalta as below     Morbid obesity with BMI 65, s/p Christoph-en-Y complicates all aspects of care     Discharge Follow Up Recommendations for outpatient labs/diagnostics:  Follow-up with PCP in 1 week  Will follow up with cardiology Dr. Cat in 6 weeks    Day of Discharge     HPI: Patient said that she did not get much rest, denies any chest pain, however does have back and hip pain.     ROS:  Gen- No fevers, chills  CV- No chest pain, palpitations  Resp- No cough, dyspnea  GI- No N/V/D, abd pain     All other systems reviewed and negative    Vital Signs:   Temp:  [98.1 °F (36.7 °C)-98.9 °F (37.2 °C)] 98.9 °F (37.2 °C)  Heart Rate:  [] 79  Resp:  [16-21] 16  BP: (127-151)/(59-89) 127/59     Physical Exam:  Constitutional: Chronically ill-appearing lady no acute distress, awake, alert  HENT: NCAT, mucous membranes moist  Respiratory: Clear to auscultation bilaterally, respiratory effort normal   Cardiovascular: RRR, no murmurs, rubs, or gallops  Gastrointestinal: Obese, positive bowel sounds, soft, nontender, nondistended  Musculoskeletal: bilateral ankle edema, chronic venous stasis changes   Psychiatric: Appropriate affect, cooperative  Neurologic: Oriented x 3, nonfocal  Skin: No rashes    Pertinent  and/or Most Recent Results     LAB RESULTS:      Lab 06/03/21  1436 06/03/21  0734 06/03/21  0403 06/03/21  0205 06/02/21  2131   WBC  --   --  7.16 7.89 7.70   HEMOGLOBIN  --   --  10.6* 11.0* 11.8*   HEMATOCRIT  --   --  34.0 36.0 37.9   PLATELETS  --   --  231 251 262   NEUTROS ABS  --   --  4.06 4.80 4.89   IMMATURE GRANS (ABS)  --   --  0.01 0.02 0.02   LYMPHS ABS  --   --  1.84 1.83 1.68   MONOS ABS  --   --  0.81 0.78 0.68   EOS ABS  --   --  0.40 0.41* 0.39   MCV  --   --  99.1* 98.6* 98.7*   PROCALCITONIN  --   --   --   --  0.05   LACTATE   --   --   --  0.7  --    PROTIME  --   --   --  13.5  --    APTT  --   --   --  27.9*  --    HEPARIN ANTI-XA 0.56 0.10*  --  0.10*  --          Lab 06/03/21  0403 06/02/21  2131   SODIUM 139 143   POTASSIUM 3.6 3.7   CHLORIDE 104 104   CO2 28.0 30.0*   ANION GAP 7.0 9.0   BUN 8 9   CREATININE 0.88 0.96   GLUCOSE 97 101*   CALCIUM 8.1* 8.9   MAGNESIUM 1.7  --    HEMOGLOBIN A1C 5.20  --    TSH 2.600  --          Lab 06/02/21  2131   TOTAL PROTEIN 6.7   ALBUMIN 4.00   GLOBULIN 2.7   ALT (SGPT) 8   AST (SGOT) 12   BILIRUBIN 0.4   ALK PHOS 140*   LIPASE 7*         Lab 06/03/21  0734 06/03/21  0403 06/03/21  0205 06/02/21  2346 06/02/21  2131   PROBNP  --   --   --   --  3,226.0*   TROPONIN T <0.010 <0.010  --  <0.010 <0.010   PROTIME  --   --  13.5  --   --    INR  --   --  1.06  --   --          Lab 06/03/21  0403   CHOLESTEROL 124   LDL CHOL 71   HDL CHOL 41   TRIGLYCERIDES 55             Brief Urine Lab Results  (Last result in the past 365 days)      Color   Clarity   Blood   Leuk Est   Nitrite   Protein   CREAT   Urine HCG        06/03/21 0303 Yellow Turbid Trace Moderate (2+) Positive Negative             Microbiology Results (last 10 days)     Procedure Component Value - Date/Time    COVID PRE-OP / PRE-PROCEDURE SCREENING ORDER (NO ISOLATION) - Swab, Nasopharynx [[id number]]  (Normal) Collected: 06/03/21 0026    Lab Status: Final result Specimen: Swab from Nasopharynx Updated: 06/03/21 0054    Narrative:      The following orders were created for panel order COVID PRE-OP / PRE-PROCEDURE SCREENING ORDER (NO ISOLATION) - Swab, Nasopharynx.  Procedure                               Abnormality         Status                     ---------                               -----------         ------                     COVID-19 and FLU A/B PCR...[[id number]]  Normal              Final result                 Please view results for these tests on the individual orders.    COVID-19 and FLU A/B PCR - Swab, Nasopharynx  [[id number]]  (Normal) Collected: 06/03/21 0026    Lab Status: Final result Specimen: Swab from Nasopharynx Updated: 06/03/21 0054     COVID19 Not Detected     Influenza A PCR Not Detected     Influenza B PCR Not Detected    Narrative:      Fact sheet for providers: https://www.fda.gov/media/300466/download    Fact sheet for patients: https://www.fda.gov/media/797675/download    Test performed by PCR.          Adult Transthoracic Echo Complete W/ Cont if Necessary Per Protocol    Result Date: 6/4/2021  · Estimated left ventricular EF = 45% Left ventricular systolic function is mildly decreased. · The left ventricular cavity is mildly dilated. · The right ventricular cavity is borderline dilated. Normal systolic function · The left atrial cavity is borderline dilated · Mild mitral annular calcification is present. Mild mitral valve regurgitation is present. · Mild tricuspid valve regurgitation is present. Estimated right ventricular systolic pressure from tricuspid regurgitation is mildly elevated (35-45 mmHg).      XR Chest 1 View    Result Date: 6/2/2021  CR Chest 1 Vw INDICATION: Left-sided chest pain radiating to neck today COMPARISON:  None available. FINDINGS: Single portable AP view(s) of the chest.  The heart and mediastinal contours are normal. The lungs are clear. No pneumothorax or pleural effusion.     No acute cardiopulmonary findings. Signer Name: Brian Reid MD  Signed: 6/2/2021 8:26 PM  Workstation Name: [id number]  [facility]    CT Angiogram Chest    Result Date: 6/2/2021  CTA Chest INDICATION: Chest pain, dissection protocol TECHNIQUE: CT angiogram of the chest with IV contrast. 3-D reconstructions were obtained and reviewed.   Radiation dose reduction techniques included automated exposure control or exposure modulation based on body size. Count of known CT and cardiac nuc med studies performed in previous 12 months: 0. COMPARISON: None available. FINDINGS: No evidence of  pulmonary embolism. The thoracic aorta appears unremarkable. There is no evidence of dissection. Postsurgical changes are seen at the GE junction and there is recurrent/residual hiatal hernia. Fluid level is seen in patients upper thoracic esophagus. Cardiac and mediastinal structures appear within normal limits. There are scattered areas of groundglass probable atelectasis with no definite concerning focal consolidation. No pneumothorax or effusion. Upper abdominal structures appear within normal limits. With no acute osseous abnormality.     1. Negative for pulmonary embolus. 2. No acute findings in the chest. 3. Postsurgical changes are seen at the GE junction and there is recurrent/residual hiatal hernia. Signer Name: Hannah Novak MD  Signed: 6/2/2021 11:43 PM  Workstation Name: [id number]  [facility]    Stress Test With Pet Myocardial Perfusion    Result Date: 6/3/2021  · Left ventricular ejection fraction is mildly to moderately reduced (Calculated EF = 38%). Calculation may have been affected by the patient's occasional ectopy throughout the study. · There were occasional PACs and PVCs throughout the study. · Myocardial perfusion imaging indicates a normal myocardial perfusion study with no evidence of ischemia. · Impressions are consistent with a low risk study.                Results for orders placed during the hospital encounter of 06/02/21    Adult Transthoracic Echo Complete W/ Cont if Necessary Per Protocol    Interpretation Summary  · Estimated left ventricular EF = 45% Left ventricular systolic function is mildly decreased.  · The left ventricular cavity is mildly dilated.  · The right ventricular cavity is borderline dilated. Normal systolic function  · The left atrial cavity is borderline dilated  · Mild mitral annular calcification is present. Mild mitral valve regurgitation is present.  · Mild tricuspid valve regurgitation is present. Estimated right ventricular systolic  pressure from tricuspid regurgitation is mildly elevated (35-45 mmHg).      Plan for Follow-up of Pending Labs/Results: PCP  Pending Labs     Order Current Status    Urine Culture - Urine, Urine, Clean Catch In process        Discharge Details        Discharge Medications      New Medications      Instructions Start Date   aspirin 81 MG EC tablet   81 mg, Oral, Daily   Start Date: June 5, 2021     atorvastatin 20 MG tablet  Commonly known as: LIPITOR   20 mg, Oral, Nightly      cefuroxime 250 MG tablet  Commonly known as: CEFTIN   250 mg, Oral, 2 Times Daily      metoprolol succinate XL 25 MG 24 hr tablet  Commonly known as: TOPROL-XL   25 mg, Oral, Every 24 Hours Scheduled      valsartan 40 MG tablet  Commonly known as: DIOVAN   40 mg, Oral, Every 24 Hours Scheduled         Continue These Medications      Instructions Start Date   amitriptyline 25 MG tablet  Commonly known as: ELAVIL   25 mg, Oral, Nightly      DULoxetine 60 MG capsule  Commonly known as: CYMBALTA   60 mg, Oral, Daily      ferrous sulfate 325 (65 FE) MG tablet   325 mg, Oral, Daily With Breakfast      furosemide 20 MG tablet  Commonly known as: LASIX   20 mg, Oral, Daily      gabapentin 300 MG capsule  Commonly known as: NEURONTIN   300 mg, Oral, 2 Times Daily      HYDROcodone-acetaminophen 7.5-325 MG per tablet  Commonly known as: NORCO   2 tablets, Oral, 2 times daily      hydrOXYzine 25 MG tablet  Commonly known as: ATARAX   25 mg, Oral, 3 Times Daily PRN      Mirabegron ER 25 MG tablet sustained-release 24 hour 24 hr tablet  Commonly known as: MYRBETRIQ   50 mg, Oral, Daily      omeprazole 40 MG capsule  Commonly known as: priLOSEC   40 mg, Oral, Daily      ondansetron ODT 8 MG disintegrating tablet  Commonly known as: ZOFRAN-ODT   8 mg, Translingual, Every 8 Hours PRN      promethazine 25 MG tablet  Commonly known as: PHENERGAN   25 mg, Oral, Every 6 Hours PRN      vitamin B-12 1000 MCG tablet  Commonly known as: CYANOCOBALAMIN   1,000 mcg,  Oral, Daily             Allergies   Allergen Reactions   • Percocet [Oxycodone-Acetaminophen] Nausea And Vomiting       Discharge Disposition: Home  Home or Self Care  Diet:  Hospital:  Diet Order   Procedures   • Diet Regular; Cardiac     Activity: As tolerated    Restrictions or Other Recommendations:  none       CODE STATUS:    Code Status and Medical Interventions:   Ordered at: 06/03/21 0104     Code Status:    CPR     Medical Interventions (Level of Support Prior to Arrest):    Full       No future appointments.      Ceasar Art MD  06/04/21    Time Spent on Discharge:  I spent  25  minutes on this discharge activity which included: face-to-face encounter with the patient, reviewing the data in the system, coordination of the care with the nursing staff as well as consultants, documentation, and entering orders.

## 2021-06-04 NOTE — CASE MANAGEMENT/SOCIAL WORK
Continued Stay Note  [facility]     Patient Name: Randa Hermosillo  MRN: [id number]  Today's Date: 6/4/2021    Admit Date: 6/2/2021    Discharge Plan     Row Name 06/04/21 1033       Plan    Plan  Home with friend    Patient/Family in Agreement with Plan  yes    Plan Comments  Plan is home with friend in [place].[facility] will transport when patient is ready for discharge. [facility] is on the chart.    Final Discharge Disposition Code  01 - home or self-care        Discharge Codes    No documentation.             Miguel Al RN

## 2021-06-05 LAB — BACTERIA SPEC AEROBE CULT: ABNORMAL

## 2021-06-16 LAB
QT INTERVAL: 380 MS
QT INTERVAL: 382 MS
QTC INTERVAL: 482 MS
QTC INTERVAL: 482 MS

## 2021-07-19 ENCOUNTER — TELEPHONE (OUTPATIENT)
Dept: CARDIOLOGY | Facility: CLINIC | Age: 61
End: 2021-07-19

## 2021-07-19 NOTE — TELEPHONE ENCOUNTER
----- Message from Noe Cat MD sent at 7/19/2021  8:48 AM EDT -----  Please let the patient know her heart monitor showed extra heartbeats but no significant arrhythmia.  No atrial fibrillation.  The medicine metoprolol that was added during her recent hospitalization is helping treat this issue.  We can discuss any necessary additional management at her next follow-up appointment.  Thanks

## 2024-01-07 NOTE — ED PROVIDER NOTES
Subjective   60-year-old female presents for evaluation of chest pain.  Of note, the patient has cardiac risk factors of hypertension and obesity.  She states that this evening, just prior to coming to the emergency department, she began experiencing left-sided chest pain that radiated to her arm and into her neck as well as in her upper back.  She denies any accompanying dyspnea or diaphoresis.  No vomiting.  Her symptoms persisted so she called EMS and was given aspirin and nitroglycerin.  She states that following the medications her chest pain resolved and notes that she is currently chest pain-free.  She denies similar episodes before in the past and is unsure as to what may have triggered her symptoms tonight.  She denies any recent illness.  No cough or fever.  She was at rest when her symptoms started.          Review of Systems   Cardiovascular: Positive for chest pain.   All other systems reviewed and are negative.      Past Medical History:   Diagnosis Date   • Asthma    • Chronic edema     BLE    • HTN (hypertension)        Allergies   Allergen Reactions   • Percocet [Oxycodone-Acetaminophen] Nausea And Vomiting       Past Surgical History:   Procedure Laterality Date   • CHOLECYSTECTOMY     • IRAIDA-EN-Y PROCEDURE     • TUBAL ABDOMINAL LIGATION         Family History   Problem Relation Age of Onset   • Heart failure Mother    • COPD Father        Social History     Socioeconomic History   • Marital status:      Spouse name: Not on file   • Number of children: Not on file   • Years of education: Not on file   • Highest education level: Not on file   Tobacco Use   • Smoking status: Never Smoker   • Smokeless tobacco: Never Used   Substance and Sexual Activity   • Alcohol use: Never   • Drug use: Never   • Sexual activity: Defer           Objective   Physical Exam  Vitals and nursing note reviewed.   Constitutional:       General: She is not in acute distress.     Appearance: She is well-developed.  [facility]  Progress Note    Patient Name: Dionicio Bess  MRN: [id number]  Patient Class: IP- Inpatient   Admission Date: 12/29/2023  Length of Stay: 5 days  Attending Physician: Ashlyn Ramos MD  Primary Care Provider: Isela, Primary Doctor        Subjective:     Principal Problem:Closed trimalleolar fracture of left ankle        HPI:  Dionicio Bess is a 53 yo M with PMHx of pAfib (on eliquis), HTN, CVA with residual L sided deficits, CAD s/p PCI, HLD, HFrEF (EF 25-30%) who presented to ED for swelling and pain to his L ankle after a ground level mechanical fall. Patient reports he tripped on a step. Now c/o 9/10 pain to L ankle with inability to bear weight. Denies LOC or head trauma. Patient also endorses intermittent lower midsternal chest pain for the past 2-3 days. Denies chest pain currently.  Also admits cramping epigastric abdominal pain with associated nausea that developed 45 mins ago. On chart review, patient had a stress test performed last month that was nondiagnostic though abnormal.  It does not appear that this was ever followed up on by cardiology as an outpatient.  The patient does have socioeconomic issues and resides in a homeless shelter ([facility]). Endorses frequent alcohol use with 2-3 beers daily. States he did have some vodka and beers yesterday before the fall. Denies fever, chills, palpitations, SOB, cough, v/d, dysuria, numbness, paresthesias, headaches.     In ED: Afebrile. HDS. Satting well on 2L NC. CBC without leukocytosis and with stable chronic anemia. CMP unremarkable. BNP 84. Trop 0.026. serum ETOH 277.  CXR with cardiomegaly and mild perihilar edema. CT ankle with trimalleolar fracture. Ortho consulted; recommending NWB to LLE and to keep iced and elevated. Too swollen for fixation at this time. CT cervical spine with degenerative changes, but without acute fracture or subluxation. CT/ MRI brain Small foci of acute to early subacute ischemia noted  She is obese. She is not diaphoretic.      Comments: Nontoxic-appearing female   HENT:      Head: Normocephalic and atraumatic.   Eyes:      Pupils: Pupils are equal, round, and reactive to light.   Cardiovascular:      Rate and Rhythm: Normal rate and regular rhythm.      Heart sounds: Normal heart sounds. No murmur heard.   No friction rub. No gallop.    Pulmonary:      Effort: Pulmonary effort is normal. No respiratory distress.      Breath sounds: Normal breath sounds. No wheezing or rales.   Abdominal:      General: Bowel sounds are normal. There is no distension.      Palpations: Abdomen is soft. There is no mass.      Tenderness: There is no abdominal tenderness. There is no guarding or rebound.   Musculoskeletal:         General: Normal range of motion.      Cervical back: Neck supple.      Right lower leg: Edema present.      Left lower leg: Edema present.      Comments: Symmetrical, 2+ lower extremity edema noted to bilateral lower legs   Skin:     General: Skin is warm and dry.      Findings: No erythema or rash.   Neurological:      General: No focal deficit present.      Mental Status: She is alert and oriented to person, place, and time.   Psychiatric:         Mood and Affect: Mood normal.         Thought Content: Thought content normal.         Judgment: Judgment normal.         Procedures           ED Course  ED Course as of Jun 03 0153 Wed Jun 02, 2021 2138 60-year-old female with cardiac risk factors of obesity and hypertension presents for evaluation of chest pain.  She states that this evening she began experiencing left-sided chest pain that radiated to her arm into her neck as well as to her upper back.  Her symptoms lasted for approximately 2 hours so she called EMS and was given aspirin and nitroglycerin.  Her symptoms resolved by the time she reached the emergency department.  On arrival, the patient is nontoxic-appearing.  She is currently chest pain-free.    [DD]   2137 Initial EKG  in the right corona radiata/basal ganglia/internal capsule, adjoining remote large MCA territory infarct.  Equivocal punctate right parietal cortical infarct margin the posterior aspect of the infarct territory. Vascular neurology consulted; recommend continuing eliquis and statin. Given IV morphine 4 mg,  dilaudid 1 mg , IV toradol, and IV zofran. Placed in observation.     Overview/Hospital Course:  Patient seen and evaluated by Vascular Neurology on ED as CT scan and MRI of brain consistent with acute right sided CVA. Neurology stated patient with no new symptoms related to this stroke and located in previous area of old CVA. CVA cardio embolic in nature as patient wih known atrial fibrillation and recommended to continue home Apixiban 5 mg po BID and Lipitor for stroke prevention and no further work-up or evaluation needed. Orthopedic surgery consulted concerning left ankle fracture and splinted fracture in the ED. CT scan of left ankle done and showed trimalleolar fracture. Patient placed non weight bearing to left lower extremity. Patient to rest, elevate and ice left leg as will require surgical repair of fracture but needs improvement in soft tissue swelling prior to proceeding. Ortho stated potentially could do surgery as outpatient but patient lives at [facility] so hard for him to get around with fractured ankle and would need to be independent at the shelter as they are unable to provide assistance to patient so will need to remain in hospital until definitve fixation surgery can be done for his left ankle. Pain controlled to left ankle.     Interval History: he was eating breakfast and portions better today. He reports that he was sleeping yesterday when therapy came and that likes to have advanced notice so he can premedicate with pain meds and prepare himself and was surprised and awakened from sleep yesterday but it happy to do therapy today. I let him know usually at rehab  patients have told me that they write it on the baord the times that each thearpy is coming as its a more regimented environment as they know how many patients they have each day due to a limited number of beds. Labs stable on review this morning.     Review of Systems   Constitutional:  Negative for fever.   Respiratory:  Negative for cough.    Cardiovascular:  Negative for chest pain.   Gastrointestinal:  Negative for abdominal pain and nausea.   Musculoskeletal:  Positive for arthralgias (Left ankle).   Neurological:  Positive for speech difficulty (Dysarthria at baseline from previous CVA) and weakness (Left sided from previous CVA). Negative for dizziness.   Psychiatric/Behavioral:  Negative for agitation.      Objective:     Vital Signs (Most Recent):  Temp: 96.9 °F (36.1 °C) (01/07/24 0833)  Pulse: 83 (01/07/24 0833)  Resp: 16 (01/07/24 0503)  BP: 126/89 (01/07/24 0833)  SpO2: 98 % (01/07/24 0833) on room air  Vital Signs (24h Range):  Temp:  [96.9 °F (36.1 °C)-98.3 °F (36.8 °C)] 96.9 °F (36.1 °C)  Pulse:  [] 83  Resp:  [16-18] 16  SpO2:  [98 %-100 %] 98 %  BP: (102-126)/(63-89) 126/89     Weight: 91.6 kg (202 lb)  Body mass index is 27.4 kg/m².  No intake or output data in the 24 hours ending 01/07/24 0949        Physical Exam  Vitals and nursing note reviewed.   Constitutional:       General: He is awake. He is not in acute distress.     Appearance: Normal appearance. He is well-developed and normal weight. He is not ill-appearing.   Cardiovascular:      Rate and Rhythm: Normal rate and regular rhythm.      Heart sounds: Normal heart sounds. No murmur heard.  Pulmonary:      Effort: Pulmonary effort is normal. No respiratory distress.      Breath sounds: Normal breath sounds. No wheezing.   Abdominal:      General: Abdomen is flat. Bowel sounds are normal. There is no distension.      Palpations: Abdomen is soft.      Tenderness: There is no abdominal tenderness.   Musculoskeletal:      Comments: Left  revealed normal sinus rhythm with a heart rate of 97 and nonspecific T wave flattening in inferior leads.     [DD]   2140 Chest x-ray is negative.    [DD]   2223 Initial labs are unrevealing.  HEART score of 5.    [DD]   2223 We will obtain advanced imaging to rule out aortic dissection, and we will seek admission to the hospitalist afterward.  Patient's case discussed with Dr. Avendaño, and the patient will be admitted under his care for further evaluation and treatment.  The patient is hemodynamically stable at this time and aware/agreeable with the plan.    [DD]      ED Course User Index  [DD] Melvin Maxwell MD                                   Recent Results (from the past 24 hour(s))   Troponin    Collection Time: 06/02/21  9:31 PM    Specimen: Blood   Result Value Ref Range    Troponin T <0.010 0.000 - 0.030 ng/mL   Comprehensive Metabolic Panel    Collection Time: 06/02/21  9:31 PM    Specimen: Blood   Result Value Ref Range    Glucose 101 (H) 65 - 99 mg/dL    BUN 9 8 - 23 mg/dL    Creatinine 0.96 0.57 - 1.00 mg/dL    Sodium 143 136 - 145 mmol/L    Potassium 3.7 3.5 - 5.2 mmol/L    Chloride 104 98 - 107 mmol/L    CO2 30.0 (H) 22.0 - 29.0 mmol/L    Calcium 8.9 8.6 - 10.5 mg/dL    Total Protein 6.7 6.0 - 8.5 g/dL    Albumin 4.00 3.50 - 5.20 g/dL    ALT (SGPT) 8 1 - 33 U/L    AST (SGOT) 12 1 - 32 U/L    Alkaline Phosphatase 140 (H) 39 - 117 U/L    Total Bilirubin 0.4 0.0 - 1.2 mg/dL    eGFR Non African Amer 59 (L) >60 mL/min/1.73    Globulin 2.7 gm/dL    A/G Ratio 1.5 g/dL    BUN/Creatinine Ratio 9.4 7.0 - 25.0    Anion Gap 9.0 5.0 - 15.0 mmol/L   Lipase    Collection Time: 06/02/21  9:31 PM    Specimen: Blood   Result Value Ref Range    Lipase 7 (L) 13 - 60 U/L   BNP    Collection Time: 06/02/21  9:31 PM    Specimen: Blood   Result Value Ref Range    proBNP 3,226.0 (H) 0.0 - 900.0 pg/mL   Light Blue Top    Collection Time: 06/02/21  9:31 PM   Result Value Ref Range    Extra Tube hold for add-on    Green  Top (Gel)    Collection Time: 06/02/21  9:31 PM   Result Value Ref Range    Extra Tube Hold for add-ons.    Lavender Top    Collection Time: 06/02/21  9:31 PM   Result Value Ref Range    Extra Tube hold for add-on    Gold Top - SST    Collection Time: 06/02/21  9:31 PM   Result Value Ref Range    Extra Tube Hold for add-ons.    Gray Top    Collection Time: 06/02/21  9:31 PM   Result Value Ref Range    Extra Tube Hold for add-ons.    CBC Auto Differential    Collection Time: 06/02/21  9:31 PM    Specimen: Blood   Result Value Ref Range    WBC 7.70 3.40 - 10.80 10*3/mm3    RBC 3.84 3.77 - 5.28 10*6/mm3    Hemoglobin 11.8 (L) 12.0 - 15.9 g/dL    Hematocrit 37.9 34.0 - 46.6 %    MCV 98.7 (H) 79.0 - 97.0 fL    MCH 30.7 26.6 - 33.0 pg    MCHC 31.1 (L) 31.5 - 35.7 g/dL    RDW 13.2 12.3 - 15.4 %    RDW-SD 47.3 37.0 - 54.0 fl    MPV 9.9 6.0 - 12.0 fL    Platelets 262 140 - 450 10*3/mm3    Neutrophil % 63.5 42.7 - 76.0 %    Lymphocyte % 21.8 19.6 - 45.3 %    Monocyte % 8.8 5.0 - 12.0 %    Eosinophil % 5.1 0.3 - 6.2 %    Basophil % 0.5 0.0 - 1.5 %    Immature Grans % 0.3 0.0 - 0.5 %    Neutrophils, Absolute 4.89 1.70 - 7.00 10*3/mm3    Lymphocytes, Absolute 1.68 0.70 - 3.10 10*3/mm3    Monocytes, Absolute 0.68 0.10 - 0.90 10*3/mm3    Eosinophils, Absolute 0.39 0.00 - 0.40 10*3/mm3    Basophils, Absolute 0.04 0.00 - 0.20 10*3/mm3    Immature Grans, Absolute 0.02 0.00 - 0.05 10*3/mm3    nRBC 0.0 0.0 - 0.2 /100 WBC   Procalcitonin    Collection Time: 06/02/21  9:31 PM    Specimen: Blood   Result Value Ref Range    Procalcitonin 0.05 0.00 - 0.25 ng/mL   Troponin    Collection Time: 06/02/21 11:46 PM    Specimen: Blood   Result Value Ref Range    Troponin T <0.010 0.000 - 0.030 ng/mL   COVID-19 and FLU A/B PCR - Swab, Nasopharynx    Collection Time: 06/03/21 12:26 AM    Specimen: Nasopharynx; Swab   Result Value Ref Range    COVID19 Not Detected Not Detected - Ref. Range    Influenza A PCR Not Detected Not Detected    Influenza B  leg in splint and elevated in bed with ice on it    Skin:     Findings: No erythema or rash.   Neurological:      Mental Status: He is alert and oriented to person, place, and time.   Psychiatric:         Mood and Affect: Mood normal.         Behavior: Behavior normal. Behavior is cooperative.         Thought Content: Thought content normal.         Judgment: Judgment normal.             Significant Labs: All pertinent labs within the past 24 hours have been reviewed.    Significant Imaging: I have reviewed all pertinent imaging results/findings within the past 24 hours.    Assessment/Plan:      * Closed trimalleolar fracture of left ankle  - Patient with imaging in ED that showed closed trimalleolar ankle fracture sustained after a ground level fall related to alcohol consumption.   - Ortho consulted for fracture and reduced and splinted in ED.  - Per Ortho at this time patient is too swollen for definitive fixation and plan is non-weight bearing to left lower extremity and keep left ankle iced and elevated to help with soft tissue swelling.   - Orthopedics will continue to follow while patient in hospital. From Orthopedic perspective, patient could follow up outpatient for definitive fixation of ankle but patient is homeless and stays at [facility] and they are unable to provide any assistance to patient and with his non weight bearing status will be difficult for him to get around so will need to remain in hospital until definitive fixation surgery can be done. Ortho aware and discussed and trying to come up with a plan.   - PT/OT consulted to work with patient while awaiting surgery.   - Pain controlled. Patient on Tylenol 1000 mg po TID + Robaxin 1000 mg po 4 times daily + Lyrica 75 mg po BID and will continue and continue with Oxycodone IR 5-10 mg po every 4 hours prn for breakthrough pain.   - Patient on his home Apixiban for atrial fibrillation so fully anticoagulated so no other DVT  PCR Not Detected Not Detected     Note: In addition to lab results from this visit, the labs listed above may include labs taken at another facility or during a different encounter within the last 24 hours. Please correlate lab times with ED admission and discharge times for further clarification of the services performed during this visit.    CT Angiogram Chest   Final Result   1. Negative for pulmonary embolus.   2. No acute findings in the chest.   3. Postsurgical changes are seen at the GE junction and there is recurrent/residual hiatal hernia.      Signer Name: Hannah Novak MD    Signed: 6/2/2021 11:43 PM    Workstation Name: [id number]     [facility]      XR Chest 1 View   Final Result   No acute cardiopulmonary findings.      Signer Name: Brian Reid MD    Signed: 6/2/2021 8:26 PM    Workstation Name: [id number]     [facility]        Vitals:    06/03/21 0030 06/03/21 0042 06/03/21 0130 06/03/21 0148   BP: 152/83  140/70    BP Location:       Patient Position:       Pulse: 89 89 90 91   Resp:  18  18   Temp:       TempSrc:       SpO2: 98% 96% 93% 91%   Weight:       Height:         Medications   sodium chloride 0.9 % flush 10 mL (has no administration in time range)   nitroglycerin (TRIDIL) 200 mcg/ml infusion (0 mcg/min Intravenous Hold 6/3/21 0024)   heparin (porcine) injection 4,000 Units (has no administration in time range)   heparin 60686 units/250 mL (100 units/mL) in 0.45 % NaCl infusion (has no administration in time range)   Pharmacy to Dose Heparin (has no administration in time range)   doxycycline (VIBRAMYCIN) 100 mg/100 mL 0.9% NS MBP (has no administration in time range)   iopamidol (ISOVUE-370) 76 % injection 100 mL (85 mL Intravenous Given 6/2/21 3064)   iopamidol (ISOVUE-370) 76 % injection 100 mL (175 mL Intravenous Given 6/2/21 6808)     ECG/EMG Results (last 24 hours)     Procedure Component Value Units Date/Time    ECG 12 Lead [[id number]]  prophylaxis needed.   OR on 1/5 s/p ORIF left ankle trimal fx and open tx left ankle syndesmosis disruption c reduction and internal fixation, now NWB x 4-6 weeks with plan for WB in cam boot after that. In splint post op. Pain controlled. Will cont to work with PT/OT, and will likely sent for auth for rehab on Monday.    Alcohol dependence with uncomplicated intoxication  - Serum ETOH 277 on admit.  - Patient denies any history of alcohol withdrawal or seizures.  - Continue MVI, Thiamine an Folate for vitamin replacement as patient with long standing history of alcohol abuse.   - Last drink was 12/30.   - No signs of acute withdrawal so far in hospital.  - Monitor CIWA every 4 hours.   - Ativan 1 mg IV prn for anxiety, tremulousness, HR >110, CIWA >8.    Stroke due to embolism of right middle cerebral artery  - Patient with known prior right cardio embolic MCA stroke in 2/2023 with residual left sided weakness involving both his arm and leg and dysarthria.   - Vascular neuro consulted as CT scan of head with new hypodensity; MRI of brain done and MRI showed acute small infarcts in right MCA territory. Vascular Neurology noted this is likely incidental finding given patient reports his weakness and dysarthria are at baseline with no recent change. Etiology of these infarcts likely cardioembolic from known Afib. No further stroke workup indicated at this time as per Vascular Neurology.   - Neurology recommends to continue anticoagulation with Apixiban 5 mg po BID. If plan for surgery, then resume anticoagulation when safe postop from surgical perspective. Med list includes Aspirin 81 mg, which is not indicated from a stroke perspective given he is anticoagulated with Apixiban as per Neurology.  - Continue Lipitor 80 mg po daily for stroke prevention.  - Patient with no new neurologic symptoms.   - PT/OT to work with patient in hospital.     Pure hypercholesterolemia  Chronic and controlled. Continue home Lipitor to  Collected: 06/02/21 1948     Updated: 06/02/21 1953    ECG 12 Lead [[id number]] Collected: 06/02/21 2154     Updated: 06/02/21 2159    ECG 12 Lead [[id number]] Collected: 06/03/21 0132     Updated: 06/03/21 0137        ECG 12 Lead         ECG 12 Lead         ECG 12 Lead                     MDM    Final diagnoses:   Chest pain, unspecified type       ED Disposition  ED Disposition     ED Disposition Condition Comment    Decision to Admit  Level of Care: Telemetry [5]   Diagnosis: Chest pain [703849]   Admitting Physician: MARKUS WALTER [[id number]]   Attending Physician: MARKUS WALTER [[id number]]   Bed Request Comments: reg            No follow-up provider specified.       Medication List      No changes were made to your prescriptions during this visit.          Melvin Maxwell MD  06/03/21 0155     treat.     Coronary artery disease involving native coronary artery with angina pectoris  Patient with known CAD s/p stent placement, which is controlled. Will continue Toprol XL, Losartan and Lipitor to treat and monitor for S/Sx of angina/ACS. Continue to monitor on telemetry.     Chronic combined systolic and diastolic congestive heart failure  Patient is identified as having Combined Systolic and Diastolic heart failure that is Chronic. CHF is currently controlled. Latest ECHO performed and demonstrates- Results for orders placed during the hospital encounter of 11/05/23    Echo    Interpretation Summary    Left Ventricle: The left ventricle is normal in size. Normal wall thickness. Global hypokinesis present. There is severely reduced systolic function with a visually estimated ejection fraction of 25 - 30%. Grade I diastolic dysfunction.    Right Ventricle: Mild right ventricular enlargement. Wall thickness is normal. Right ventricle wall motion  is normal. Systolic function is mildly reduced.    Biatrial enlargement    Pulmonary Artery: The estimated pulmonary artery systolic pressure is 19 mmHg.    IVC/SVC: Normal venous pressure at 3 mmHg.  Continue home Losartan, Lasix and Toprol XL to treat. Monitor clinical status closely. Monitor on telemetry. Patient is off CHF pathway.  Monitor strict Is&Os and daily weights. Place on fluid restriction of 1.5 L. Cardiology has not been consulted. Continue to stress to patient importance of self efficacy and  on diet for CHF. Last BNP reviewed- and noted below   Recent Labs   Lab 12/29/23  2211   BNP 84         Essential hypertension  Chronic, controlled. Latest blood pressure and vitals reviewed-     Temp:  [97.6 °F (36.4 °C)-98.2 °F (36.8 °C)]   Pulse:  [64-84]   Resp:  [15-18]   BP: (108-129)/(57-79)   SpO2:  [95 %-100 %] .   Home meds for hypertension were reviewed and noted below.   Hypertension Medications               amLODIPine (NORVASC) 10 MG tablet  Take 1 tablet (10 mg total) by mouth once daily.    furosemide (LASIX) 40 MG tablet Take 1 tablet (40 mg total) by mouth once daily.    losartan (COZAAR) 25 MG tablet Take 1 tablet (25 mg total) by mouth once daily.    metoprolol succinate (TOPROL-XL) 50 MG 24 hr tablet Take 1 tablet (50 mg total) by mouth once daily.    spironolactone (ALDACTONE) 25 MG tablet Take 1 tablet (25 mg total) by mouth once daily.            While in the hospital, will manage blood pressure as follows; Continue home antihypertensive regimen    Will utilize p.r.n. blood pressure medication only if patient's blood pressure greater than 180/110 and he develops symptoms such as worsening chest pain or shortness of breath.    Paroxysmal atrial fibrillation  Patient with Paroxysmal (<7 days) atrial fibrillation which is controlled currently with Toprol XL 50 mg po daily and will continue. Patient is currently in sinus rhythm.[id number] Score: 4.  Anticoagulation indicated. Anticoagulation done with Apixiban 5 mg po BID.  Reports needs to cont pill packs he currently receives on discharge as had trouble opening pill bottles in the past leading to a remote hx of eliquis noncompliance but was compliant on admit as was doing pill packs      VTE Risk Mitigation (From admission, onward)           Ordered     apixaban tablet 5 mg  2 times daily         12/30/23 1242     IP VTE HIGH RISK PATIENT  Once         12/30/23 1206     Reason for No Pharmacological VTE Prophylaxis  Once        Question:  Reasons:  Answer:  Already adequately anticoagulated on oral Anticoagulants    12/30/23 1206                    Discharge Planning   ERWIN: 1/8/2024     Code Status: Full Code   Is the patient medically ready for discharge?: No    Reason for patient still in hospital (select all that apply): Patient trending condition  Discharge Plan A: Rehab                  Ashlyn Ramos MD  Department of Hospital Medicine   [facility] - Surgery